# Patient Record
Sex: MALE | Race: WHITE | Employment: UNEMPLOYED | URBAN - METROPOLITAN AREA
[De-identification: names, ages, dates, MRNs, and addresses within clinical notes are randomized per-mention and may not be internally consistent; named-entity substitution may affect disease eponyms.]

---

## 2019-12-11 ENCOUNTER — TELEPHONE (OUTPATIENT)
Dept: ENT CLINIC | Age: 24
End: 2019-12-11

## 2019-12-11 ENCOUNTER — OFFICE VISIT (OUTPATIENT)
Dept: ENT CLINIC | Age: 24
End: 2019-12-11
Payer: COMMERCIAL

## 2019-12-11 ENCOUNTER — PROCEDURE VISIT (OUTPATIENT)
Dept: AUDIOLOGY | Age: 24
End: 2019-12-11
Payer: COMMERCIAL

## 2019-12-11 VITALS — DIASTOLIC BLOOD PRESSURE: 72 MMHG | OXYGEN SATURATION: 98 % | SYSTOLIC BLOOD PRESSURE: 144 MMHG | HEART RATE: 98 BPM

## 2019-12-11 DIAGNOSIS — H90.41 SENSORINEURAL HEARING LOSS (SNHL) OF RIGHT EAR WITH UNRESTRICTED HEARING OF LEFT EAR: Primary | ICD-10-CM

## 2019-12-11 DIAGNOSIS — H90.5 HEARING LOSS, SENSORINEURAL, UNILATERAL: ICD-10-CM

## 2019-12-11 PROCEDURE — 92557 COMPREHENSIVE HEARING TEST: CPT | Performed by: AUDIOLOGIST

## 2019-12-11 PROCEDURE — 99203 OFFICE O/P NEW LOW 30 MIN: CPT | Performed by: OTOLARYNGOLOGY

## 2019-12-11 ASSESSMENT — ENCOUNTER SYMPTOMS
VOICE CHANGE: 0
ALLERGIC/IMMUNOLOGIC NEGATIVE: 1
FACIAL SWELLING: 0
RESPIRATORY NEGATIVE: 1
RHINORRHEA: 0
SORE THROAT: 0
TROUBLE SWALLOWING: 0
EYES NEGATIVE: 1
SINUS PRESSURE: 0
SINUS PAIN: 0

## 2019-12-17 ENCOUNTER — HOSPITAL ENCOUNTER (OUTPATIENT)
Dept: MRI IMAGING | Age: 24
Discharge: HOME | End: 2019-12-17
Payer: COMMERCIAL

## 2019-12-17 DIAGNOSIS — H90.5 HEARING LOSS, SENSORINEURAL, UNILATERAL: ICD-10-CM

## 2019-12-17 PROCEDURE — 2580000003 HC RX 258: Performed by: OTOLARYNGOLOGY

## 2019-12-17 PROCEDURE — 6360000004 HC RX CONTRAST MEDICATION: Performed by: OTOLARYNGOLOGY

## 2019-12-17 PROCEDURE — A9577 INJ MULTIHANCE: HCPCS | Performed by: OTOLARYNGOLOGY

## 2019-12-17 PROCEDURE — 70553 MRI BRAIN STEM W/O & W/DYE: CPT

## 2019-12-17 RX ORDER — SODIUM CHLORIDE 0.9 % (FLUSH) 0.9 %
10 SYRINGE (ML) INJECTION ONCE
Status: COMPLETED | OUTPATIENT
Start: 2019-12-17 | End: 2019-12-17

## 2019-12-17 RX ADMIN — GADOBENATE DIMEGLUMINE 17 ML: 529 INJECTION, SOLUTION INTRAVENOUS at 11:05

## 2019-12-17 RX ADMIN — Medication 10 ML: at 11:06

## 2020-09-09 ENCOUNTER — OFFICE VISIT (OUTPATIENT)
Dept: ORTHOPEDIC SURGERY | Age: 25
End: 2020-09-09

## 2020-09-09 ENCOUNTER — OFFICE VISIT (OUTPATIENT)
Dept: ORTHOPEDIC SURGERY | Age: 25
End: 2020-09-09
Payer: COMMERCIAL

## 2020-09-09 VITALS — HEIGHT: 74 IN | BODY MASS INDEX: 21.17 KG/M2 | WEIGHT: 165 LBS

## 2020-09-09 PROCEDURE — 99999 PR OFFICE/OUTPT VISIT,PROCEDURE ONLY: CPT | Performed by: ORTHOPAEDIC SURGERY

## 2020-09-09 PROCEDURE — 99203 OFFICE O/P NEW LOW 30 MIN: CPT | Performed by: ORTHOPAEDIC SURGERY

## 2020-09-09 NOTE — PROGRESS NOTES
Chief Complaint  Elbow Pain (R ELBOW )      History of Present Illness:  Hermine Scheuermann is a 31 y.o. male , right-hand-dominant, currently unemployed, presenting with history of right elbow injury  Date of injury: Approximately 3 weeks ago  Mechanism of injury: The patient was trying out a hover board when he felt onto his right elbow directly onto the concrete  He did have immediate pain and swelling and has had difficulty with motion of his elbow since that time. He has not had any formal treatment but followed up earlier today with my partner Dr. Robert Sahu where images were obtained demonstrating a comminuted olecranon fracture. He states that he did delay his treatment and evaluation secondary to several factors including financial constraints. He has been trying to use his right elbow but has had difficulty and has noticed progressive stiffness as well as persistent pain and swelling. No specific numbness or tingling described in his right upper extremity or fingers    Medical History  Patient's medications, allergies, past medical, surgical, social and family histories were reviewed and updated as appropriate. Review of Systems  Pertinent items are noted in HPI  Review of systems reviewed from Patient History Form dated on 9/9/20 and available in the patient's chart under the Media tab. Vital Signs  There were no vitals filed for this visit. General Exam:   Constitutional: Patient is adequately groomed with no evidence of malnutrition  Mental Status: The patient is oriented to time, place and person. The patient's mood and affect are appropriate. Lymphatic: The lymphatic examination bilaterally reveals all areas to be without enlargement or induration. Neurological: The patient has good coordination. There is no weakness or sensory deficit.      Right Elbow/right upper extremity examination  Inspection: Moderate swelling and right elbow effusion  No open wounds or skin changes throughout the right elbow including abrasions  No obvious deformity of the right elbow  No swelling throughout the remainder of the right upper extremity    Palpation: Tenderness to palpation overlying the olecranon process and proximal subcutaneous border of the ulna  Grossly nontender throughout the remainder of the elbow forearm or wrist    Range of Motion: Limited range of motion today with testing patient demonstrates active range of motion approximately 60 to 110 degrees of flexion with discomfort, no gross instability  70 degrees of pronation and supination with mild discomfort  Painless wrist and finger range of motion is full composite fist and full extension of all fingers    Strength: 5 out of 5 strength AIN/PIN/interossei  5 out of 5 FDS FDP EDC interossei    Special Tests: Gross sensation intact light touch median ulnar radial nerve without deficit  2+ palpable radial pulse with brisk capillary refill all fingers    Skin: There are no additional worrisome rashes, ulcerations or lesions. Gait: normal    Circulation normal    Additional Comments:     Additional Examinations:  Left Upper Extremity: Examination of the left upper extremity does not show any tenderness, deformity or injury. Range of motion is unremarkable. There is no gross instability. There are no rashes, ulcerations or lesions. Strength and tone are normal.      Radiology:     X-rays reviewed in office:  3 views of the right elbow from 9/9/2020 demonstrate comminuted displaced olecranon fracture right elbow with no evidence of interval healing. No additional osseous abnormality or joint subluxation or dislocation      Assessment: 53-year-old male presenting with history of right elbow injury after fall from hover board approximately 3 weeks ago  1. Subacute right comminuted olecranon fracture    Impression:  Encounter Diagnoses   Name Primary?     Right elbow pain Yes    Closed fracture of right olecranon process, initial encounter        Office Procedures:  No orders of the defined types were placed in this encounter. Treatment Plan: I reviewed the images and discussed with the patient today his diagnosis. Unfortunately he is now 3 weeks status post injury and has had continued discomfort and swelling. Based on his displacement and comminution I think that the most appropriate option for him would be to consider operative intervention with open reduction internal fixation of his fracture. This would likely be performed with plate and screw fixation with the possible need for additional bone grafting. I do think the challenges include at this time related to his delay in treatment the likelihood of stiffness postoperatively as well as the possibility of more difficult reduction and treatment secondary to his delay but I do think that his best option would be to proceed with treatment. The risks and benefits of surgical fixation versus non-operative management of the elbow fracture were discussed thoroughly. These included, but were not limited to infection, tendon or nerve injury, stiffness or pain of the elbow joint long-term, malunion, nonunion, implant failure, tendon rupture, scar sensitivity, adverse effects of anesthesia, and possible need for hardware removal.  I acknowledge that the patient is in a difficult situation now secondary to his financial constraints and did refer him to financial aid department today to discuss best options for him regarding the potential for surgical intervention. He is understanding and right now we will plan to proceed with the process of surgery scheduling with consent obtained in clinic today preoperative medical work-up initiated. He specifically understands the possibility with further delaying treatment of difficulty with treatment of his injury and further likelihood of unpredictable outcomes.   He also understands postoperative course necessary including protection and period of immobilization followed by progressive range of motion of his elbow.

## 2020-09-09 NOTE — PROGRESS NOTES
Present Illness:  Tereza Herr is a 31 y.o. male patient is a 58-year-old male being seen for the first time he fell off a hover board 3 weeks ago injuring his right elbow    Chief complaint presents in the office today: Right elbow pain    Location right elbow  Severity severe/moderate  Duration 3 weeks  Associated sign/symptoms right elbow swelling and loss of motion    I have reviewed and discussed the below Pain assessment findings with the patient. Medical History  Patient's medications, allergies, past medical, surgical, social and family histories were reviewed and updated as appropriate. No past medical history on file. No family history on file. Social History     Socioeconomic History    Marital status: Single     Spouse name: None    Number of children: None    Years of education: None    Highest education level: None   Occupational History    None   Social Needs    Financial resource strain: None    Food insecurity     Worry: None     Inability: None    Transportation needs     Medical: None     Non-medical: None   Tobacco Use    Smoking status: Light Tobacco Smoker    Smokeless tobacco: Never Used   Substance and Sexual Activity    Alcohol use: No    Drug use: No    Sexual activity: Not Currently   Lifestyle    Physical activity     Days per week: None     Minutes per session: None    Stress: None   Relationships    Social connections     Talks on phone: None     Gets together: None     Attends Congregation service: None     Active member of club or organization: None     Attends meetings of clubs or organizations: None     Relationship status: None    Intimate partner violence     Fear of current or ex partner: None     Emotionally abused: None     Physically abused: None     Forced sexual activity: None   Other Topics Concern    None   Social History Narrative    None     No current outpatient medications on file.      No current facility-administered medications for this degrees very stiff very sore    Strength: Right shoulder strength:   internal rotation against resistance is 5/5  external rotation against resistance is 5/5  and supraspinatus isolation against resistance is 5/5, Shoulder shrug is 5 over 5 , cervical spine strength is excellent, flexion extension at the elbow is 5 over 5, wrist and hand strength is equal bilaterally with supination pronation and flexion and extension  no winging no muscle atrophy. He can slightly flex and extend against resistance of the elbow which is very painful moderate to severe      Special Tests:  Palpation demonstrates no swelling no effusion moderate pain. There is extremely limited active and passive range of motion. Strength is intact but weak with internal rotation against resistance external rotation against resistance supraspinatus isolation against resistance. Shoulder shrug strength is 5 over 5 equal bilaterally. Radial ulnar and median nerve function is intact. Capillary refill is brisk. Sensation is intact from neck down to the fingers. Elbow motion finger and wrist motion is full equal bilaterally. Deep tendon reflexes of the Brachial radialis, biceps, triceps are all +2/4 equal bilaterally. Cervical spine range of motion is full without pain negative Spurling's test.  Load-and-shift test is negative. Crank test is negative. Apprehension and relocation are negative. Anterior and posterior glide are equal bilaterally. Negative sulcus sign. No signs of any significant multidirectional instability. There is no scapular winging. There is no muscle atrophy of the latissimus dorsi, the deltoid, the periscapular musculature, the trapezius musculature or the pectoralis musculature. Positive Neer's test, positive Zavaleta test, positive pain with crossarm elevation.   Elbow pain to palpation with moderate swelling no abrasions no lacerations no signs of any laceration or open areas just painful to palpation and swollen and loss of motion      Gait: normal gait     Reflex: Deep tendon reflexes of the biceps, triceps, brachioradialis +2/4 equal bilaterally. Lower extremity reflexes:  +2/4 and equal bilaterally for patella and Achilles. Contralateral Shoulder exam: Palpation demonstrates no swelling no effusion no pain. There is full active and passive range of motion bilaterally. Strength is excellent with internal rotation against resistance external rotation against resistance supraspinatus isolation against resistance. Shoulder shrug strength is 5 over 5 equal bilaterally. Radial ulnar and median nerve function is intact. Capillary refill is brisk. Elbow motion finger and wrist motion is full equal bilaterally. Deep tendon reflexes of the Brachial radialis, biceps, triceps are all +2/4 equal bilaterally. Cervical spine range of motion is full without pain negative Spurling's test.  Load-and-shift test is negative. Crank test is negative. Apprehension and relocation are negative. Anterior and posterior glide are equal bilaterally. Negative sulcus sign. No signs of any significant multidirectional instability. There is no scapular winging. There is no muscle atrophy of the latissimus dorsi, the deltoid, the periscapular musculature, the trapezius musculature or the pectoralis musculature. Negative Neer's test, negative Zavaleta test, no pain with crossarm elevation. Abduction --150 degrees  Flexion-- 180 degrees  Extension-- between 45-60 degrees  Latera/external rotation --close to 90 degrees  Medial/ internal rotation -- between 70-90 degree    CERVICAL SPINE  EXAMINATION:  · Inspection: Local inspection shows no step-off or bruising. Cervical alignment is normal. No instability is noted. · Palpation and Percussion: No evidence of tenderness at the midline. Paraspinal tenderness is not present. There is no paraspinal spasm.   · Range of Motion:  pain-free ROM   · Strength: 5/5 bilateral upper extremities. · Special Tests:   Spurling's and Conrad's are negative bilaterally. Zavaleta and Impingement tests are negative bilaterally. · Skin:There are no rashes, ulcerations or lesions. · Reflexes: Bilaterally triceps, biceps and brachioradialis are 2+. Clonus absent bilaterally at the feet. No pathological reflexes are noted. · Gait & station:  normal, patient ambulates without assistance and no ataxia      Cranial nerve exam:    1- smell-- patient states no Olfactory problem  2- visual acuity is intact  3- moves eyes, and pupils are reactive  4- extra-ocular muscles are intact  5- facial sensation is intact no muscle atrophy  6- extra ocular muscles are intact  7- mouth moist and facial expressions are intact  8- good hearing and no difficulty with recognition  9- patient has no difficulty swallowing  10- no difficulty breathing and no Gastrointestinal problems good cough   11- moves head with all motion and no swallowing problems  12- normal speech and tongue protrudes midline    Additional Examinations:  Right Lower Extremity: Examination of the right lower extremity does not show any tenderness, deformity or injury. Range of motion is unremarkable. There is no gross instability. There are no rashes, ulcerations or lesions. Strength and tone are normal.  Left Lower Extremity: Examination of the left lower extremity does not show any tenderness, deformity or injury. Range of motion is unremarkable. There is no gross instability. There are no rashes, ulcerations or lesions.   Strength and tone are normal.        IMPRESSION:    Diagnostic testing:  X-rays reviewed in office by myself, I personally and independently reviewed the films in the office today : I reviewed multiple X-rays today of the right elbow:  Anterior posterior, lateral, :  Show displaced olecranon fracture, no dislocation, no signs of any masses or tumors, no significant arthritis,  good joint space maintenance,    Impression 1week-old displaced olecranon fracture  MRI: Not at this time  Labs:None at this time. No results found. No past surgical history on file. .    Office Procedures:  Orders Placed This Encounter   Procedures    XR ELBOW RIGHT (MIN 3 VIEWS)     Standing Status:   Future     Number of Occurrences:   1     Standing Expiration Date:   2021       Previous Treatments: Ice and rest, X-ray, anti-inflammatories,    Differential Diagnoses: Instability, loose body, soft tissue injury, ligament and tendon injury, long head of bicep injury, neck pathology, brachioplexis injury, muscle injury, neck radiculopathy, bone tumor, fracture or stress fracture. Diagnosis displaced olecranon fracture      Plan (Medical Decision Making):    I discussed the diagnosis and the treatment options with Viki Prakash today. In Summary:  The various treatment options were outlined and discussed with Viki Prakash including:  Conservative care options: physical therapy, ice, medications, bracing, and activity modification. The indications for therapeutic injections. The indications for additional imaging/laboratory studies. The indications for (possible future) interventions. After considering the various options discussed, Viki Prakash elected to pursue a course of treatment that includes the followin. Medications: No further recommendations for new medications. 2. PT:  Just no exercise or movement at this time    3. Further studies: No further studies. 4. Interventional: Discussed with him in great detail he cannot leave this alone at this point he will not have any use of this elbow we need to set him up with 1 of our hand surgeons for an evaluation and treatment unfortunately at this point he has no insurance I will not charge him today and we will try to get him help from the hospital    5.  Healthy Lifestyle Measures:  Patient education material reviewing the following was distributed to [address] [place] [address] Jaclyn Gomez  Anatomic drawings  Healthy lifestyle education  Osteoporosis prevention,   Back and neck pain educational information   Advanced imaging preparedness    Posture education   Proper lifting and carrying techniques,  Weight management discussed  For further information regarding the spine conditions and to review interventional treatments the patient was directed to [facility].    6.  Follow up:  as needed    Casimiro Prather was instructed to call the office if his symptoms worsen or if new symptoms appear prior to the next scheduled visit. He is specifically instructed to contact the office between now & his scheduled appointment if he has concerns related to his condition or if he needs assistance in scheduling the above tests. He is   welcome to call for an appointment sooner if he has any additional concerns or questions. Matty Coy D.O. [facility]  [facility] Trained  Board Certified  [facility] Team Physician      Disclaimer: \"This note was dictated with voice recognition software. Though review and correction are routine, we apologize for any errors. \"

## 2020-09-10 ENCOUNTER — NURSE ONLY (OUTPATIENT)
Dept: PRIMARY CARE CLINIC | Age: 25
End: 2020-09-10

## 2020-09-10 PROCEDURE — 99211 OFF/OP EST MAY X REQ PHY/QHP: CPT | Performed by: NURSE PRACTITIONER

## 2020-09-10 NOTE — PROGRESS NOTES
Treatment order in effect during my hospitalization, the order may or may not be in effect during this procedure. I give my doctor permission to give me blood or blood products. I understand that there are risks with receiving blood such as hepatitis, AIDS, fever, or allergic reaction. I acknowledge that the risks, benefits, and alternatives of this treatment have been explained to me and that no express or implied warranty has been given by the hospital, any blood bank, or any person or entity as to the blood or blood components transfused. At the discretion of my doctor, I agree to allow observers, equipment/product representatives and allow photographing, and/or televising of the procedure, provided my name or identity is maintained confidentially. I agree the hospital may dispose of or use for scientific or educational purposes any tissue, fluid, or body parts which may be removed.     ________________________________Date________Time______ am/pm  ([facility])  Patient or Signature of Closest Relative or Legal Guardian    ________________________________Date________Time______am/pm      Page 1 of  1  Witness

## 2020-09-11 ENCOUNTER — ANESTHESIA EVENT (OUTPATIENT)
Dept: OPERATING ROOM | Age: 25
End: 2020-09-11
Payer: COMMERCIAL

## 2020-09-11 NOTE — PROGRESS NOTES
[facility] PRE-SURGICAL TESTING INSTRUCTIONS                              PRIOR TO PROCEDURE DATE:  1. Please follow any guidelines/instructions prior to your procedure as advised by your surgeon. 2. Arrange for someone to drive you home and be with you for the first 24 hours after discharge for your safety after your procedure for which you received sedation. Ensure it is someone we can share information with regarding your discharge. 3. You must contact your surgeon for instructions IF:   You are taking any blood thinners, aspirin, anti-inflammatory or vitamin E.   There is a change in your physical condition such as a cold, fever, rash, cuts, sores or any other infection, especially near your surgical site. 4. Do not drink alcohol the day before or day of your procedure. 5. A Pre-op History and Physical for surgery MUST be completed by your Physician or Urgent Care within 30 days of your procedure date. Please bring a copy with you on the day of your procedure and along with any other testing performed. THE DAY OF YOUR PROCEDURE:  1. Follow instructions for ARRIVAL TIME as DIRECTED BY YOUR SURGEON. I    2. Enter the MAIN entrance from [address] and follow the signs to the free [employer] or [employer] parking (offered free of charge 6am-5pm). 3. Enter the Main Entrance of the hospital (do not enter from the lower level of the parking garage). Upon entrance, check in with the  at the main desk on your left. If no one is available at the desk, proceed into the [facility] Waiting Room and go through the door directly into the [facility]. There is a Check-in desk ACROSS from Room 5 (marked with a sign hanging from the ceiling). The phone number for the surgery center is [phone]. 4. Please call [phone] option #2 option #2 if you have not been preregistered yet. On the day of your procedure bring your insurance card and photo ID.  You will be registered at your bedside once brought back to your room. 5. DO NOT EAT ANYTHING eight hours prior to surgery. May have 8 ounces of water 4 hours prior to surgery. 6. MEDICATIONS    Take the following medications with a SMALL sip of water:    Use your usual dose of inhalers the morning of surgery. BRING your rescue inhaler with you to hospital.    Anesthesia does NOT want you to take insulin the morning of surgery. They will control your blood sugar while you are at the hospital. Please contact your ordering physician for instructions regarding your insulin the night before your procedure. If you have an insulin pump, please keep it set on basal rate. 7. Do not swallow water when brushing teeth. No gum, candy, mints or ice chips. Refrain from smoking or at least decrease the amount. 8. Dress in loose, comfortable clothing appropriate for redressing after your procedure. Do not wear jewelry (including body piercings), make-up (especially NO eye make-up), fingernail polish (NO toenail polish if foot/leg surgery), lotion, powders or metal hairclips. 9. Dentures, glasses, or contacts will need to be removed before your procedure. Bring cases for your glasses, contacts, dentures, or hearing aids to protect them while you are in surgery. 10. If you use a CPAP, please bring it with you on the day of your procedure. 11. We recommend that valuable personal  belongings such as cash, cell phones, e-tablets or jewelry, be left at home during your stay. The hospital will not be responsible for valuables that are not secured in the hospital safe. However, if your insurance requires a co-pay, you may want to bring a method of payment, i.e. Check or credit card, if you wish to pay your co-pay the day of surgery. 12. If you are to stay overnight, you may bring a bag with personal items.  Please have any large items you may need brought in by your family after your arrival to your hospital room.    13. If you have a Living Will or Durable Power of , please bring a copy on the day of your procedure. 15. With your permission, one family member may accompany you while you are being prepared for surgery. Once you are ready, additional family members may join you. HOW WE KEEP YOU SAFE and WORK TO PREVENT SURGICAL SITE INFECTIONS:  1. Health care workers should always check your ID bracelet to verify your name and birth date. You will be asked many times to state your name, date of birth, and allergies. 2. Health care workers should always clean their hands with soap or alcohol gel before providing care to you. It is okay to ask anyone if they cleaned their hands before they touch you. 3. You will be actively involved in verifying the type of procedure you are having and ensuring the correct surgical site. This will be confirmed multiple times prior to your procedure. Do NOT drea your surgery site UNLESS instructed to by your surgeon. 4. Do not shave or wax for 72 hours prior to procedure near your operative site. Shaving with a razor can irritate your skin and make it easier to develop an infection. On the day of your procedure, any hair that needs to be removed near the surgical site will be clipped by a healthcare worker using a special clippers designed to avoid skin irritation. 5. When you are in the operating room, your surgical site will be cleansed with a special soap, and in most cases, you will be given an antibiotic before the surgery begins. What to expect AFTER YOUR PROCEDURE:  1. Immediately following your procedure, your will be taken to the PACU for the first phase of your recovery. Your nurse will help you recover from any potential side effects of anesthesia, such as extreme drowsiness, changes in your vital signs or breathing patterns. Nausea, headache, muscle aches, or sore throat may also occur after anesthesia.   Your nurse will help you manage these potential side effects. 2. For comfort and safety, arrange to have someone at home with you for the first 24 hours after discharge. 3. You and your family will be given written instructions about your diet, activity, dressing care, medications, and return visits. 4. Once at home, should issues with nausea, pain, or bleeding occur, or should you notice any signs of infection, you should call your surgeon. 5. Always clean your hands before and after caring for your wound. Do not let your family touch your surgery site without cleaning their hands. 6. Narcotic pain medications can cause significant constipation. You may want to add a stool softener to your postoperative medication schedule or speak to your surgeon on how best to manage this SIDE EFFECT. SPECIAL INSTRUCTIONS     Thank you for allowing us to care for you. We strive to exceed your expectations in the delivery of care and service provided to you and your family. If you need to contact us for any reason, please call us at [phone]    Instructions reviewed with patient during preadmission testing phone interview. Alley Romero. 9/11/2020 .7:46 AM      ADDITIONAL EDUCATIONAL INFORMATION REVIEWED PER PHONE WITH YOU AND/OR YOUR FAMILY:  No Bring a urine sample on day of surgery  Yes Pain Goal-Taking Control of Your Pain  Yes FAQs about Surgical Site Infections  No Hibiclens® Bathing Instructions   Yes Antibacterial Soap  No Moises® Wipes Bathing Instructions (Obtained from: https://www.FRESS/. pdf )  No Incentive Spirometer Education  No Other

## 2020-09-12 LAB — SARS-COV-2, NAA: NOT DETECTED

## 2020-09-14 ENCOUNTER — APPOINTMENT (OUTPATIENT)
Dept: GENERAL RADIOLOGY | Age: 25
End: 2020-09-14
Attending: ORTHOPAEDIC SURGERY
Payer: COMMERCIAL

## 2020-09-14 ENCOUNTER — HOSPITAL ENCOUNTER (OUTPATIENT)
Age: 25
Setting detail: OUTPATIENT SURGERY
Discharge: HOME | End: 2020-09-14
Attending: ORTHOPAEDIC SURGERY | Admitting: ORTHOPAEDIC SURGERY
Payer: COMMERCIAL

## 2020-09-14 ENCOUNTER — ANESTHESIA (OUTPATIENT)
Dept: OPERATING ROOM | Age: 25
End: 2020-09-14
Payer: COMMERCIAL

## 2020-09-14 VITALS
BODY MASS INDEX: 23.74 KG/M2 | RESPIRATION RATE: 26 BRPM | OXYGEN SATURATION: 97 % | SYSTOLIC BLOOD PRESSURE: 146 MMHG | DIASTOLIC BLOOD PRESSURE: 86 MMHG | HEART RATE: 110 BPM | WEIGHT: 185 LBS | HEIGHT: 74 IN | TEMPERATURE: 98.9 F

## 2020-09-14 VITALS
TEMPERATURE: 97.3 F | RESPIRATION RATE: 11 BRPM | SYSTOLIC BLOOD PRESSURE: 154 MMHG | OXYGEN SATURATION: 98 % | DIASTOLIC BLOOD PRESSURE: 68 MMHG

## 2020-09-14 PROCEDURE — 73070 X-RAY EXAM OF ELBOW: CPT

## 2020-09-14 PROCEDURE — 6360000002 HC RX W HCPCS: Performed by: ORTHOPAEDIC SURGERY

## 2020-09-14 PROCEDURE — 3209999900 FLUORO FOR SURGICAL PROCEDURES

## 2020-09-14 PROCEDURE — 3600000014 HC SURGERY LEVEL 4 ADDTL 15MIN: Performed by: ORTHOPAEDIC SURGERY

## 2020-09-14 PROCEDURE — 6360000002 HC RX W HCPCS: Performed by: NURSE ANESTHETIST, CERTIFIED REGISTERED

## 2020-09-14 PROCEDURE — 2580000003 HC RX 258: Performed by: NURSE ANESTHETIST, CERTIFIED REGISTERED

## 2020-09-14 PROCEDURE — 2500000003 HC RX 250 WO HCPCS: Performed by: NURSE ANESTHETIST, CERTIFIED REGISTERED

## 2020-09-14 PROCEDURE — 2720000010 HC SURG SUPPLY STERILE: Performed by: ORTHOPAEDIC SURGERY

## 2020-09-14 PROCEDURE — C1762 CONN TISS, HUMAN(INC FASCIA): HCPCS | Performed by: ORTHOPAEDIC SURGERY

## 2020-09-14 PROCEDURE — 73080 X-RAY EXAM OF ELBOW: CPT

## 2020-09-14 PROCEDURE — 64415 NJX AA&/STRD BRCH PLXS IMG: CPT | Performed by: ANESTHESIOLOGY

## 2020-09-14 PROCEDURE — 3700000000 HC ANESTHESIA ATTENDED CARE: Performed by: ORTHOPAEDIC SURGERY

## 2020-09-14 PROCEDURE — 7100000001 HC PACU RECOVERY - ADDTL 15 MIN: Performed by: ORTHOPAEDIC SURGERY

## 2020-09-14 PROCEDURE — 6360000002 HC RX W HCPCS: Performed by: ANESTHESIOLOGY

## 2020-09-14 PROCEDURE — 3700000001 HC ADD 15 MINUTES (ANESTHESIA): Performed by: ORTHOPAEDIC SURGERY

## 2020-09-14 PROCEDURE — 2500000003 HC RX 250 WO HCPCS: Performed by: ANESTHESIOLOGY

## 2020-09-14 PROCEDURE — 2580000003 HC RX 258: Performed by: ANESTHESIOLOGY

## 2020-09-14 PROCEDURE — C1713 ANCHOR/SCREW BN/BN,TIS/BN: HCPCS | Performed by: ORTHOPAEDIC SURGERY

## 2020-09-14 PROCEDURE — 3600000004 HC SURGERY LEVEL 4 BASE: Performed by: ORTHOPAEDIC SURGERY

## 2020-09-14 PROCEDURE — 2580000003 HC RX 258: Performed by: ORTHOPAEDIC SURGERY

## 2020-09-14 PROCEDURE — 2709999900 HC NON-CHARGEABLE SUPPLY: Performed by: ORTHOPAEDIC SURGERY

## 2020-09-14 PROCEDURE — 7100000000 HC PACU RECOVERY - FIRST 15 MIN: Performed by: ORTHOPAEDIC SURGERY

## 2020-09-14 DEVICE — IMPLANTABLE DEVICE: Type: IMPLANTABLE DEVICE | Status: FUNCTIONAL

## 2020-09-14 DEVICE — SCREW BNE L20MM DIA3.5MM CORT DST TIB TYP II ANODIZED ST: Type: IMPLANTABLE DEVICE | Status: FUNCTIONAL

## 2020-09-14 DEVICE — SCREW BNE L20MM DIA3.5MM STD CORT DST TIB TI ST LOK FULL: Type: IMPLANTABLE DEVICE | Status: FUNCTIONAL

## 2020-09-14 DEVICE — SCREW BNE L20MM DIA3.5MM CO CHROM ST LOK FULL THRD SQ DRV: Type: IMPLANTABLE DEVICE | Status: FUNCTIONAL

## 2020-09-14 DEVICE — SCREW BNE L14MM DIA3.5MM STD CORT DST TIB TI ST LOK FULL: Type: IMPLANTABLE DEVICE | Status: FUNCTIONAL

## 2020-09-14 DEVICE — K WIRE FIX L6IN DIA1.6MM FEM TIB SMOOTH BAYNT TOT FT FOR: Type: IMPLANTABLE DEVICE | Status: FUNCTIONAL

## 2020-09-14 DEVICE — WASHER ORTH DIA3.5MM CORT DST FIBULAR EL LO PROF FOR FRAC: Type: IMPLANTABLE DEVICE | Status: FUNCTIONAL

## 2020-09-14 DEVICE — SCREW BNE L16MM DIA3.5MM STD CORT DST TIB TI ST LOK FULL: Type: IMPLANTABLE DEVICE | Status: FUNCTIONAL

## 2020-09-14 DEVICE — ALLOGRAFT BNE CHIP 4-9.5 MM 5 CC FD CANC STRL 31021405 ALLOSOURCE: Type: IMPLANTABLE DEVICE | Status: FUNCTIONAL

## 2020-09-14 RX ORDER — HYDROMORPHONE HCL 110MG/55ML
PATIENT CONTROLLED ANALGESIA SYRINGE INTRAVENOUS PRN
Status: DISCONTINUED | OUTPATIENT
Start: 2020-09-14 | End: 2020-09-14 | Stop reason: SDUPTHER

## 2020-09-14 RX ORDER — ROPIVACAINE HYDROCHLORIDE 5 MG/ML
INJECTION, SOLUTION EPIDURAL; INFILTRATION; PERINEURAL
Status: COMPLETED
Start: 2020-09-14 | End: 2020-09-14

## 2020-09-14 RX ORDER — METOCLOPRAMIDE HYDROCHLORIDE 5 MG/ML
10 INJECTION INTRAMUSCULAR; INTRAVENOUS
Status: COMPLETED | OUTPATIENT
Start: 2020-09-14 | End: 2020-09-14

## 2020-09-14 RX ORDER — METOPROLOL TARTRATE 5 MG/5ML
INJECTION INTRAVENOUS
Status: DISCONTINUED
Start: 2020-09-14 | End: 2020-09-14 | Stop reason: HOSPADM

## 2020-09-14 RX ORDER — ONDANSETRON 2 MG/ML
4 INJECTION INTRAMUSCULAR; INTRAVENOUS ONCE
Status: COMPLETED | OUTPATIENT
Start: 2020-09-14 | End: 2020-09-14

## 2020-09-14 RX ORDER — OXYCODONE HYDROCHLORIDE 5 MG/1
5 TABLET ORAL PRN
Status: DISCONTINUED | OUTPATIENT
Start: 2020-09-14 | End: 2020-09-14 | Stop reason: HOSPADM

## 2020-09-14 RX ORDER — FENTANYL CITRATE 50 UG/ML
INJECTION, SOLUTION INTRAMUSCULAR; INTRAVENOUS
Status: COMPLETED
Start: 2020-09-14 | End: 2020-09-14

## 2020-09-14 RX ORDER — MIDAZOLAM HYDROCHLORIDE 1 MG/ML
INJECTION INTRAMUSCULAR; INTRAVENOUS PRN
Status: DISCONTINUED | OUTPATIENT
Start: 2020-09-14 | End: 2020-09-14 | Stop reason: SDUPTHER

## 2020-09-14 RX ORDER — OXYCODONE HYDROCHLORIDE 5 MG/1
10 TABLET ORAL PRN
Status: DISCONTINUED | OUTPATIENT
Start: 2020-09-14 | End: 2020-09-14 | Stop reason: HOSPADM

## 2020-09-14 RX ORDER — METOPROLOL TARTRATE 5 MG/5ML
5 INJECTION INTRAVENOUS ONCE
Status: COMPLETED | OUTPATIENT
Start: 2020-09-14 | End: 2020-09-14

## 2020-09-14 RX ORDER — MORPHINE SULFATE 4 MG/ML
1 INJECTION, SOLUTION INTRAMUSCULAR; INTRAVENOUS EVERY 5 MIN PRN
Status: DISCONTINUED | OUTPATIENT
Start: 2020-09-14 | End: 2020-09-14 | Stop reason: HOSPADM

## 2020-09-14 RX ORDER — PROPOFOL 10 MG/ML
INJECTION, EMULSION INTRAVENOUS PRN
Status: DISCONTINUED | OUTPATIENT
Start: 2020-09-14 | End: 2020-09-14 | Stop reason: SDUPTHER

## 2020-09-14 RX ORDER — DEXAMETHASONE SODIUM PHOSPHATE 4 MG/ML
INJECTION, SOLUTION INTRA-ARTICULAR; INTRALESIONAL; INTRAMUSCULAR; INTRAVENOUS; SOFT TISSUE
Status: COMPLETED
Start: 2020-09-14 | End: 2020-09-14

## 2020-09-14 RX ORDER — MIDAZOLAM HYDROCHLORIDE 1 MG/ML
INJECTION INTRAMUSCULAR; INTRAVENOUS
Status: COMPLETED
Start: 2020-09-14 | End: 2020-09-14

## 2020-09-14 RX ORDER — DIPHENHYDRAMINE HYDROCHLORIDE 50 MG/ML
12.5 INJECTION INTRAMUSCULAR; INTRAVENOUS
Status: DISCONTINUED | OUTPATIENT
Start: 2020-09-14 | End: 2020-09-14 | Stop reason: HOSPADM

## 2020-09-14 RX ORDER — MEPERIDINE HYDROCHLORIDE 25 MG/ML
12.5 INJECTION INTRAMUSCULAR; INTRAVENOUS; SUBCUTANEOUS EVERY 5 MIN PRN
Status: DISCONTINUED | OUTPATIENT
Start: 2020-09-14 | End: 2020-09-14 | Stop reason: HOSPADM

## 2020-09-14 RX ORDER — ONDANSETRON 2 MG/ML
INJECTION INTRAMUSCULAR; INTRAVENOUS PRN
Status: DISCONTINUED | OUTPATIENT
Start: 2020-09-14 | End: 2020-09-14 | Stop reason: SDUPTHER

## 2020-09-14 RX ORDER — ONDANSETRON 2 MG/ML
INJECTION INTRAMUSCULAR; INTRAVENOUS
Status: DISCONTINUED
Start: 2020-09-14 | End: 2020-09-14 | Stop reason: HOSPADM

## 2020-09-14 RX ORDER — HYDRALAZINE HYDROCHLORIDE 20 MG/ML
5 INJECTION INTRAMUSCULAR; INTRAVENOUS EVERY 10 MIN PRN
Status: DISCONTINUED | OUTPATIENT
Start: 2020-09-14 | End: 2020-09-14 | Stop reason: HOSPADM

## 2020-09-14 RX ORDER — SODIUM CHLORIDE 0.9 % (FLUSH) 0.9 %
10 SYRINGE (ML) INJECTION PRN
Status: DISCONTINUED | OUTPATIENT
Start: 2020-09-14 | End: 2020-09-14 | Stop reason: HOSPADM

## 2020-09-14 RX ORDER — SODIUM CHLORIDE, SODIUM LACTATE, POTASSIUM CHLORIDE, CALCIUM CHLORIDE 600; 310; 30; 20 MG/100ML; MG/100ML; MG/100ML; MG/100ML
INJECTION, SOLUTION INTRAVENOUS CONTINUOUS PRN
Status: DISCONTINUED | OUTPATIENT
Start: 2020-09-14 | End: 2020-09-14 | Stop reason: SDUPTHER

## 2020-09-14 RX ORDER — SODIUM CHLORIDE 9 MG/ML
INJECTION, SOLUTION INTRAVENOUS CONTINUOUS
Status: DISCONTINUED | OUTPATIENT
Start: 2020-09-14 | End: 2020-09-14 | Stop reason: HOSPADM

## 2020-09-14 RX ORDER — ROCURONIUM BROMIDE 10 MG/ML
INJECTION, SOLUTION INTRAVENOUS PRN
Status: DISCONTINUED | OUTPATIENT
Start: 2020-09-14 | End: 2020-09-14 | Stop reason: SDUPTHER

## 2020-09-14 RX ORDER — FENTANYL CITRATE 50 UG/ML
INJECTION, SOLUTION INTRAMUSCULAR; INTRAVENOUS PRN
Status: DISCONTINUED | OUTPATIENT
Start: 2020-09-14 | End: 2020-09-14 | Stop reason: SDUPTHER

## 2020-09-14 RX ORDER — SODIUM CHLORIDE 0.9 % (FLUSH) 0.9 %
10 SYRINGE (ML) INJECTION EVERY 12 HOURS SCHEDULED
Status: DISCONTINUED | OUTPATIENT
Start: 2020-09-14 | End: 2020-09-14 | Stop reason: HOSPADM

## 2020-09-14 RX ORDER — DEXAMETHASONE SODIUM PHOSPHATE 4 MG/ML
INJECTION, SOLUTION INTRA-ARTICULAR; INTRALESIONAL; INTRAMUSCULAR; INTRAVENOUS; SOFT TISSUE PRN
Status: DISCONTINUED | OUTPATIENT
Start: 2020-09-14 | End: 2020-09-14 | Stop reason: SDUPTHER

## 2020-09-14 RX ORDER — LABETALOL 20 MG/4 ML (5 MG/ML) INTRAVENOUS SYRINGE
5 EVERY 10 MIN PRN
Status: DISCONTINUED | OUTPATIENT
Start: 2020-09-14 | End: 2020-09-14 | Stop reason: HOSPADM

## 2020-09-14 RX ORDER — LIDOCAINE HYDROCHLORIDE 20 MG/ML
INJECTION, SOLUTION INTRAVENOUS PRN
Status: DISCONTINUED | OUTPATIENT
Start: 2020-09-14 | End: 2020-09-14 | Stop reason: SDUPTHER

## 2020-09-14 RX ORDER — HYDROCODONE BITARTRATE AND ACETAMINOPHEN 5; 325 MG/1; MG/1
1 TABLET ORAL EVERY 6 HOURS PRN
Qty: 20 TABLET | Refills: 0 | Status: SHIPPED | OUTPATIENT
Start: 2020-09-14 | End: 2020-09-19

## 2020-09-14 RX ORDER — PROMETHAZINE HYDROCHLORIDE 25 MG/ML
6.25 INJECTION, SOLUTION INTRAMUSCULAR; INTRAVENOUS
Status: DISCONTINUED | OUTPATIENT
Start: 2020-09-14 | End: 2020-09-14 | Stop reason: HOSPADM

## 2020-09-14 RX ORDER — ROPIVACAINE HYDROCHLORIDE 5 MG/ML
INJECTION, SOLUTION EPIDURAL; INFILTRATION; PERINEURAL
Status: COMPLETED | OUTPATIENT
Start: 2020-09-14 | End: 2020-09-14

## 2020-09-14 RX ORDER — MAGNESIUM HYDROXIDE 1200 MG/15ML
LIQUID ORAL CONTINUOUS PRN
Status: COMPLETED | OUTPATIENT
Start: 2020-09-14 | End: 2020-09-14

## 2020-09-14 RX ADMIN — DEXAMETHASONE SODIUM PHOSPHATE 4 MG: 4 INJECTION, SOLUTION INTRAMUSCULAR; INTRAVENOUS at 12:40

## 2020-09-14 RX ADMIN — METOCLOPRAMIDE 10 MG: 5 INJECTION, SOLUTION INTRAMUSCULAR; INTRAVENOUS at 17:56

## 2020-09-14 RX ADMIN — HYDROMORPHONE HYDROCHLORIDE 1 MG: 2 INJECTION, SOLUTION INTRAMUSCULAR; INTRAVENOUS; SUBCUTANEOUS at 16:16

## 2020-09-14 RX ADMIN — CEFAZOLIN 2 G: 10 INJECTION, POWDER, FOR SOLUTION INTRAVENOUS at 13:19

## 2020-09-14 RX ADMIN — FAMOTIDINE 20 MG: 10 INJECTION, SOLUTION INTRAVENOUS at 12:44

## 2020-09-14 RX ADMIN — SODIUM CHLORIDE, SODIUM LACTATE, POTASSIUM CHLORIDE, AND CALCIUM CHLORIDE: 600; 310; 30; 20 INJECTION, SOLUTION INTRAVENOUS at 13:00

## 2020-09-14 RX ADMIN — MIDAZOLAM HYDROCHLORIDE 2 MG: 2 INJECTION, SOLUTION INTRAMUSCULAR; INTRAVENOUS at 12:40

## 2020-09-14 RX ADMIN — ROPIVACAINE HYDROCHLORIDE 30 ML: 5 INJECTION, SOLUTION EPIDURAL; INFILTRATION; PERINEURAL at 13:09

## 2020-09-14 RX ADMIN — HYDROMORPHONE HYDROCHLORIDE 1 MG: 2 INJECTION, SOLUTION INTRAMUSCULAR; INTRAVENOUS; SUBCUTANEOUS at 15:46

## 2020-09-14 RX ADMIN — FENTANYL CITRATE 50 MCG: 50 INJECTION INTRAMUSCULAR; INTRAVENOUS at 14:54

## 2020-09-14 RX ADMIN — ROCURONIUM BROMIDE 50 MG: 10 INJECTION INTRAVENOUS at 13:05

## 2020-09-14 RX ADMIN — ROCURONIUM BROMIDE 30 MG: 10 INJECTION INTRAVENOUS at 13:28

## 2020-09-14 RX ADMIN — ONDANSETRON 4 MG: 2 INJECTION INTRAMUSCULAR; INTRAVENOUS at 18:29

## 2020-09-14 RX ADMIN — ONDANSETRON 4 MG: 2 INJECTION INTRAMUSCULAR; INTRAVENOUS at 16:46

## 2020-09-14 RX ADMIN — PROPOFOL 200 MG: 10 INJECTION, EMULSION INTRAVENOUS at 13:05

## 2020-09-14 RX ADMIN — SODIUM CHLORIDE: 9 INJECTION, SOLUTION INTRAVENOUS at 12:12

## 2020-09-14 RX ADMIN — MIDAZOLAM HYDROCHLORIDE 2 MG: 2 INJECTION, SOLUTION INTRAMUSCULAR; INTRAVENOUS at 12:44

## 2020-09-14 RX ADMIN — METOPROLOL TARTRATE 5 MG: 5 INJECTION INTRAVENOUS at 18:52

## 2020-09-14 RX ADMIN — DEXAMETHASONE SODIUM PHOSPHATE 10 MG: 4 INJECTION, SOLUTION INTRAMUSCULAR; INTRAVENOUS at 16:46

## 2020-09-14 RX ADMIN — FENTANYL CITRATE 100 MCG: 50 INJECTION INTRAMUSCULAR; INTRAVENOUS at 12:40

## 2020-09-14 RX ADMIN — SODIUM CHLORIDE, SODIUM LACTATE, POTASSIUM CHLORIDE, AND CALCIUM CHLORIDE: 600; 310; 30; 20 INJECTION, SOLUTION INTRAVENOUS at 15:56

## 2020-09-14 RX ADMIN — FENTANYL CITRATE 50 MCG: 50 INJECTION INTRAMUSCULAR; INTRAVENOUS at 13:05

## 2020-09-14 RX ADMIN — LIDOCAINE HYDROCHLORIDE 100 MG: 20 INJECTION, SOLUTION INTRAVENOUS at 13:05

## 2020-09-14 ASSESSMENT — PULMONARY FUNCTION TESTS
PIF_VALUE: 16
PIF_VALUE: 15
PIF_VALUE: 15
PIF_VALUE: 16
PIF_VALUE: 16
PIF_VALUE: 1
PIF_VALUE: 15
PIF_VALUE: 16
PIF_VALUE: 15
PIF_VALUE: 16
PIF_VALUE: 15
PIF_VALUE: 15
PIF_VALUE: 1
PIF_VALUE: 15
PIF_VALUE: 16
PIF_VALUE: 16
PIF_VALUE: 15
PIF_VALUE: 16
PIF_VALUE: 16
PIF_VALUE: 15
PIF_VALUE: 15
PIF_VALUE: 13
PIF_VALUE: 4
PIF_VALUE: 16
PIF_VALUE: 18
PIF_VALUE: 0
PIF_VALUE: 15
PIF_VALUE: 15
PIF_VALUE: 1
PIF_VALUE: 15
PIF_VALUE: 15
PIF_VALUE: 16
PIF_VALUE: 16
PIF_VALUE: 15
PIF_VALUE: 14
PIF_VALUE: 15
PIF_VALUE: 2
PIF_VALUE: 15
PIF_VALUE: 16
PIF_VALUE: 15
PIF_VALUE: 0
PIF_VALUE: 15
PIF_VALUE: 16
PIF_VALUE: 15
PIF_VALUE: 16
PIF_VALUE: 15
PIF_VALUE: 16
PIF_VALUE: 15
PIF_VALUE: 16
PIF_VALUE: 16
PIF_VALUE: 15
PIF_VALUE: 13
PIF_VALUE: 15
PIF_VALUE: 15
PIF_VALUE: 16
PIF_VALUE: 16
PIF_VALUE: 13
PIF_VALUE: 15
PIF_VALUE: 16
PIF_VALUE: 16
PIF_VALUE: 15
PIF_VALUE: 15
PIF_VALUE: 16
PIF_VALUE: 16
PIF_VALUE: 15
PIF_VALUE: 1
PIF_VALUE: 16
PIF_VALUE: 15
PIF_VALUE: 15
PIF_VALUE: 16
PIF_VALUE: 16
PIF_VALUE: 15
PIF_VALUE: 15
PIF_VALUE: 16
PIF_VALUE: 15
PIF_VALUE: 16
PIF_VALUE: 15
PIF_VALUE: 15
PIF_VALUE: 16
PIF_VALUE: 16
PIF_VALUE: 15
PIF_VALUE: 16
PIF_VALUE: 16
PIF_VALUE: 15
PIF_VALUE: 15
PIF_VALUE: 16
PIF_VALUE: 16
PIF_VALUE: 15
PIF_VALUE: 24
PIF_VALUE: 15
PIF_VALUE: 15
PIF_VALUE: 16
PIF_VALUE: 15
PIF_VALUE: 16
PIF_VALUE: 16
PIF_VALUE: 15
PIF_VALUE: 15
PIF_VALUE: 16
PIF_VALUE: 15
PIF_VALUE: 16
PIF_VALUE: 15
PIF_VALUE: 16
PIF_VALUE: 15
PIF_VALUE: 15
PIF_VALUE: 16
PIF_VALUE: 15
PIF_VALUE: 16
PIF_VALUE: 15
PIF_VALUE: 15
PIF_VALUE: 13
PIF_VALUE: 13
PIF_VALUE: 19
PIF_VALUE: 15
PIF_VALUE: 16
PIF_VALUE: 16
PIF_VALUE: 15
PIF_VALUE: 16
PIF_VALUE: 15
PIF_VALUE: 2
PIF_VALUE: 1
PIF_VALUE: 15
PIF_VALUE: 16
PIF_VALUE: 15
PIF_VALUE: 16
PIF_VALUE: 2
PIF_VALUE: 16
PIF_VALUE: 16
PIF_VALUE: 15
PIF_VALUE: 15
PIF_VALUE: 16
PIF_VALUE: 15
PIF_VALUE: 15
PIF_VALUE: 16
PIF_VALUE: 15
PIF_VALUE: 16
PIF_VALUE: 15
PIF_VALUE: 4
PIF_VALUE: 15
PIF_VALUE: 4
PIF_VALUE: 15
PIF_VALUE: 15
PIF_VALUE: 14
PIF_VALUE: 16
PIF_VALUE: 15
PIF_VALUE: 15
PIF_VALUE: 16
PIF_VALUE: 15
PIF_VALUE: 16
PIF_VALUE: 15
PIF_VALUE: 16
PIF_VALUE: 15
PIF_VALUE: 15
PIF_VALUE: 16
PIF_VALUE: 15
PIF_VALUE: 16
PIF_VALUE: 15
PIF_VALUE: 16
PIF_VALUE: 15
PIF_VALUE: 14
PIF_VALUE: 16
PIF_VALUE: 1
PIF_VALUE: 15
PIF_VALUE: 16
PIF_VALUE: 16
PIF_VALUE: 14
PIF_VALUE: 15
PIF_VALUE: 16
PIF_VALUE: 13
PIF_VALUE: 15
PIF_VALUE: 15
PIF_VALUE: 16
PIF_VALUE: 16
PIF_VALUE: 13
PIF_VALUE: 16
PIF_VALUE: 15
PIF_VALUE: 15
PIF_VALUE: 3
PIF_VALUE: 16
PIF_VALUE: 13
PIF_VALUE: 15
PIF_VALUE: 16
PIF_VALUE: 16
PIF_VALUE: 0
PIF_VALUE: 16
PIF_VALUE: 15
PIF_VALUE: 20
PIF_VALUE: 16
PIF_VALUE: 15
PIF_VALUE: 15
PIF_VALUE: 16
PIF_VALUE: 15
PIF_VALUE: 15
PIF_VALUE: 16
PIF_VALUE: 16
PIF_VALUE: 15
PIF_VALUE: 16
PIF_VALUE: 15
PIF_VALUE: 16
PIF_VALUE: 13
PIF_VALUE: 16
PIF_VALUE: 14
PIF_VALUE: 15
PIF_VALUE: 15
PIF_VALUE: 16
PIF_VALUE: 16
PIF_VALUE: 15
PIF_VALUE: 16
PIF_VALUE: 16
PIF_VALUE: 15
PIF_VALUE: 16
PIF_VALUE: 14
PIF_VALUE: 15
PIF_VALUE: 16
PIF_VALUE: 15

## 2020-09-14 ASSESSMENT — LIFESTYLE VARIABLES: SMOKING_STATUS: 1

## 2020-09-14 ASSESSMENT — PAIN - FUNCTIONAL ASSESSMENT: PAIN_FUNCTIONAL_ASSESSMENT: 0-10

## 2020-09-14 NOTE — ANESTHESIA PRE PROCEDURE
Department of Anesthesiology  Preprocedure Note       Name:  Lori Watt   Age:  31 y.o.  :  1995                                          MRN:  [id number]         Date:  2020      Surgeon: Maria Ines Mccabe):  Starr Navarro MD    Procedure: Procedure(s):  OPERATIVE FIXATION WITH OPEN REDUCTION INTERNAL FIXATION OF RIGHT OLECRANON FRACTURE, ANY INDICATED PROCEDURES    Medications prior to admission:   Prior to Admission medications    Not on File       Current medications:    Current Facility-Administered Medications   Medication Dose Route Frequency Provider Last Rate Last Dose    ceFAZolin (ANCEF) 2 g in dextrose 5 % 50 mL IVPB  2 g Intravenous Once Starr Navarro MD        sodium chloride flush 0.9 % injection 10 mL  10 mL Intravenous 2 times per day Loreda Emmett, DO        sodium chloride flush 0.9 % injection 10 mL  10 mL Intravenous PRN Loreda Emmett, DO        0.9 % sodium chloride infusion   Intravenous Continuous Loreda Emmett  mL/hr at 20 1212      midazolam (VERSED) 2 MG/2ML injection             fentaNYL (SUBLIMAZE) 100 MCG/2ML injection             dexamethasone (DECADRON) 4 MG/ML injection             ropivacaine (NAROPIN) 0.5% injection                Allergies:  No Known Allergies    Problem List:  There is no problem list on file for this patient. Past Medical History:  History reviewed. No pertinent past medical history. Past Surgical History:  History reviewed. No pertinent surgical history. Social History:    Social History     Tobacco Use    Smoking status: Light Tobacco Smoker    Smokeless tobacco: Never Used   Substance Use Topics    Alcohol use:  No                                Ready to quit: Not Answered  Counseling given: Not Answered      Vital Signs (Current):   Vitals:    20 1146 20 1245 20 1250 20 1255   BP: (!) 150/69 (!) 165/97 (!) 160/96 (!) 170/96   Pulse: 90 108 88 89   Resp: 16 13 9 12   Temp: 98.2 °F (36.8 °C)      TempSrc: Oral      SpO2: 100% 100% 100% 100%   Weight: 185 lb (83.9 kg)      Height: 6' 2\" (1.88 m)                                                 BP Readings from Last 3 Encounters:   09/14/20 (!) 170/96   12/11/19 (!) 144/72       NPO Status: Time of last liquid consumption: 2100                        Time of last solid consumption: 2100                        Date of last liquid consumption: 09/13/20                        Date of last solid food consumption: 09/13/20    BMI:   Wt Readings from Last 3 Encounters:   09/14/20 185 lb (83.9 kg)   09/09/20 165 lb (74.8 kg)     Body mass index is 23.75 kg/m². CBC: No results found for: WBC, RBC, HGB, HCT, MCV, RDW, PLT    CMP: No results found for: NA, K, CL, CO2, BUN, CREATININE, GFRAA, AGRATIO, LABGLOM, GLUCOSE, PROT, CALCIUM, BILITOT, ALKPHOS, AST, ALT    POC Tests: No results for input(s): POCGLU, POCNA, POCK, POCCL, POCBUN, POCHEMO, POCHCT in the last 72 hours. Coags: No results found for: PROTIME, INR, APTT    HCG (If Applicable): No results found for: PREGTESTUR, PREGSERUM, HCG, HCGQUANT     ABGs: No results found for: PHART, PO2ART, TCT5KDW, NPU6WAO, BEART, Q6CPBTHE     Type & Screen (If Applicable):  No results found for: LABABO, LABRH    Drug/Infectious Status (If Applicable):  No results found for: HIV, HEPCAB    COVID-19 Screening (If Applicable):   Lab Results   Component Value Date    COVID19 NOT DETECTED 09/10/2020         Anesthesia Evaluation  Patient summary reviewed and Nursing notes reviewed  Airway: Mallampati: II  TM distance: >3 FB   Neck ROM: full  Mouth opening: > = 3 FB Dental: normal exam         Pulmonary:Negative Pulmonary ROS and normal exam  breath sounds clear to auscultation  (+) current smoker          Patient did not smoke on day of surgery.                  Cardiovascular:Negative CV ROS  Exercise tolerance: good (>4 METS),           Rhythm: regular  Rate: normal           Beta Blocker:  Not on Beta Blocker         Neuro/Psych: Negative Neuro/Psych ROS              GI/Hepatic/Renal: Neg GI/Hepatic/Renal ROS            Endo/Other: Negative Endo/Other ROS                    Abdominal:       Abdomen: soft. Vascular: negative vascular ROS. Anesthesia Plan      general     ASA 2       Induction: intravenous. MIPS: Postoperative opioids intended and Prophylactic antiemetics administered. Anesthetic plan and risks discussed with patient. Use of blood products discussed with patient whom consented to blood products. Plan discussed with attending and CRNA.     Attending anesthesiologist reviewed and agrees with Pre Eval content              Ludwin Delgado DO   9/14/2020

## 2020-09-14 NOTE — OP NOTE
as this fracture was subacute and nearly one month out from injury. .  The olecranon shaft more distally was also freed of the soft tissue. Nearby ulnar nerve was protected and identified. The nerve was released at osbournes ligament and more proximally as well as the interval between FCU muscle, taking care to identify and protect the nerve. .  Early hematoma was gently debrided from the fracture site and from within the joint. Cartilage appeared to be intact. The joint was copiously irrigated to assure that there was no loose body. We were able to reduce the fracture into anatomic alignment and hold with temporary K wires, with protecting the ulnar nerve. Fluoroscopy confirmed excellent alignment at the joint surface. The olecranon plate was placed on top of the triceps and along the shaft of the olecranon. Proximal screws and distal screws were all placed utilizing standard AO technique and taking great care to measure the appropriate length screws. Secondary to the amount of comminution, we performed a bridge plate construct proximal and distal fixation without placement of a homerun screw to prevent compression that may have further displaced the fracture. .  Temporizing K wires were all removed. The elbow was placed through a range of motion, motion was fluid and non-crepitant. Allograft cancellous bone graft chips were also placed at the fracture site and dorsal cortex as well as a large dorsal cortical piece of bone that was placed along the lateral cortex of the proximal ulna. No instability noted. Excellent alignment of the arm clinically and fluoroscopically. Final fluoroscopic images were saved and printed. Wound was copiously irrigated. No other abnormality noted. Skin was closed with interrupted, inverted Vicryl followed by nylon suture on the skin. Tourniquet was deflated and all fingers were warm and well perfused.   Patient tolerated the procedure well, was awoken from sedation, and was taken to the post anesthesia recovery unit in good condition. No complications. Findings:       Displaced olecranon fracture    Intervention:      Addendum: please note that 3 views [id number] views using c arm were used for the right elbow demonstrating appropriate fracture and joint alignment and appropriate position of hardware, personally reviewed and interpreted by me. Other Notes: Follow-up in approximately 7-10 days for wound inspection and suture removal.  Patient will be referred to the hand therapist for a posterior long-arm brace with the hand and wrist free. Brace off for hygiene, showers, and to begin early motion several times a day. Begin progressive range of motion of the elbow wrist and hand at the first postoperative visit. No lifting or strengthening for 6-8 weeks.           Ronni Weaver MD    Hand & Upper Extremity Surgery  [address] [facility] [place] [facility] ([facility])

## 2020-09-14 NOTE — PROGRESS NOTES
Patient to pacu 5, S/P  OPERATIVE FIXATION WITH OPEN REDUCTION INTERNAL FIXATION OF RIGHT OLECRANON FRACTURE, report received from CRNA, reported hemodynamically stable intra op.  Patient with oral airway in at this time SPo2 92%

## 2020-09-14 NOTE — H&P
Danielle Schmidt    [id number]      Department of General Surgery    Surgical Services     Pre-operative History and Physical      INDICATION:   Closed olecranon fracture, right, initial encounter [S52.021A]    Procedure(s):  OPERATIVE FIXATION WITH OPEN REDUCTION INTERNAL FIXATION OF RIGHT OLECRANON FRACTURE, ANY INDICATED PROCEDURES    CHIEF COMPLAINT: right shoulder pain and limited range of motion. History obtained from: Patient interview and EHR     HISTORY:   The patient is a 31 y.o. male with a past medical and surgical history are delineated below. He was on a hover board about 4.5 weeks ago and ended up falling off injuring his elbow. Weight loss/gain:  No  Recent Hx Steroid use: Yes  Used about 3 days of prednisone for inflammation after falling, stopped about 4 days ago  History of allergic reaction to anesthesia:  Never received anesthesia  Covid 19:  Patient denies fever, chills, cough or known exposure to Covid-19. Patient reports they have been quarantined at home since Covid-19 test.     Past Medical History:    History reviewed. No pertinent past medical history. Past Surgical History:    History reviewed. No pertinent surgical history. Medications Prior to Admission:   No outpatient medications taken    Allergies:  Patient has no known allergies.     Social History:   Social History     Socioeconomic History    Marital status: Single     Spouse name: None    Number of children: None    Years of education: None    Highest education level: None   Occupational History    None   Social Needs    Financial resource strain: None    Food insecurity     Worry: None     Inability: None    Transportation needs     Medical: None     Non-medical: None   Tobacco Use    Smoking status: Light Tobacco Smoker    Smokeless tobacco: Never Used   Substance and Sexual Activity    Alcohol use: No    Drug use: No    Sexual activity: Not Currently   Lifestyle    Physical activity     Days per week: None     Minutes per session: None    Stress: None   Relationships    Social connections     Talks on phone: None     Gets together: None     Attends Baptism service: None     Active member of club or organization: None     Attends meetings of clubs or organizations: None     Relationship status: None    Intimate partner violence     Fear of current or ex partner: None     Emotionally abused: None     Physically abused: None     Forced sexual activity: None   Other Topics Concern    None   Social History Narrative    None         Family History:   History reviewed. No pertinent family history. REVIEW OF SYSTEMS:    Constitutional: Negative for chills, fatigue and fever. HENT: Negative for loss of hearing   Eyes: Negative for visual disturbance. Respiratory: Negative for  cough, chest tightness, shortness of breath and wheezing. Cardiovascular: Negative for chest pain, palpitations and exertional chest pressure  Gastrointestinal: Negative for constipation, diarrhea, nausea and vomiting. Musculoskeletal: Complains of right elbow pain and limited range of motion. Negative for gait problem, and joint swelling. Skin: Negative for rash and nonhealing wounds. Neurological: Negative for dizziness, syncope, light-headedness,    Hematological: Does not bruise/bleed easily. Psychiatric/Behavioral: Negative for agitation    PHYSICAL EXAM:      BP (!) 150/69   Pulse 90   Temp 98.2 °F (36.8 °C) (Oral)   Resp 16   Ht 6' 2\" (1.88 m)   Wt 185 lb (83.9 kg)   SpO2 100%   BMI 23.75 kg/m²  I      Eyes:  pupils equal, round and reactive to light and conjunctiva normal    Head/ENT:  Normocephalic, without obvious abnormality, atramatic,     Neck:  Supple, symmetrical, trachea midline, no adenopathy, thyroid symmetric, not enlarged and no tenderness, skin warm and dry.     Heart: regular rate and rhythm, no murmur    Lungs:  No increased work of breathing, good air exchange, clear to auscultation

## 2020-09-14 NOTE — BRIEF OP NOTE
Brief Postoperative Note      Patient: Gallo Tony  YOB: 1995  MRN: [id number]    Date of Procedure: 9/14/2020    Pre-Op Diagnosis: subacute Closed olecranon fracture, right, initial encounter [S52.021A    Post-Op Diagnosis: Same       Procedure(s):  OPERATIVE FIXATION WITH OPEN REDUCTION INTERNAL FIXATION OF RIGHT OLECRANON FRACTURE    Surgeon(s):  Best Horne MD    Assistant:  Surgical Assistant: Hildegard Speaker Holter; Mertie Finer, RN    Anesthesia: General, regional    Estimated Blood Loss (mL): 851NW    Complications: None immediate apparent    Specimens:   none  Implants:  Biomet olecranon plate with associated locking and nonlocking screws    Drains: none    Findings: see fully dictated operative report    Electronically signed by Ida Messer MD on 9/14/2020 at 5:25 PM

## 2020-09-14 NOTE — ANESTHESIA PROCEDURE NOTES
Peripheral Block    Patient location during procedure: pre-op  Start time: 9/14/2020 12:40 PM  End time: 9/14/2020 12:50 PM  Staffing  Anesthesiologist: Carlos Broderick DO  Performed: anesthesiologist   Preanesthetic Checklist  Completed: patient identified, site marked, surgical consent, pre-op evaluation, timeout performed, IV checked, risks and benefits discussed, monitors and equipment checked, anesthesia consent given, oxygen available and patient being monitored  Peripheral Block  Patient position: supine  Prep: ChloraPrep  Patient monitoring: cardiac monitor, continuous pulse ox, frequent blood pressure checks and IV access  Block type: Brachial plexus  Laterality: right  Injection technique: single-shot  Procedures: ultrasound guided  Local infiltration: ropivacaine  Supraclavicular  Provider prep: mask and sterile gloves  Local infiltration: ropivacaine  Needle  Needle type: short-bevel   Needle gauge: 22 G  Needle length: 5 cm  Needle localization: ultrasound guidance  Assessment  Injection assessment: negative aspiration for heme, no paresthesia on injection and local visualized surrounding nerve on ultrasound  Hemodynamics: stable  Additional Notes  With 4 mg decadron   Medications Administered  Ropivacaine (NAROPIN) injection 0.5%, 30 mL  Reason for block: procedure for pain, post-op pain management and at surgeon's request

## 2020-09-14 NOTE — H&P
I have reviewed the history and physical and examined the patient and find no relevant changes. I have reviewed with the patient and/or family the risks, benefits, and alternatives to the procedure.     Gogo Gabriel MD  9/14/2020

## 2020-09-14 NOTE — PROGRESS NOTES
Anesthesia here to do block. right supraclavicular block  O2 Cris@[employer]  Start time:1246  Stop time:1255  Tolerated well    See flow sheet for vital signs.

## 2020-09-15 ENCOUNTER — TELEPHONE (OUTPATIENT)
Dept: ORTHOPEDIC SURGERY | Age: 25
End: 2020-09-15

## 2020-09-15 NOTE — PROGRESS NOTES
After resting, patients wave of nausea went away. Patient assisted to bathroom and wheelchair to the car. Patient stated, \"I am ready to go home. \" all vitals stable.

## 2020-09-15 NOTE — PROGRESS NOTES
PACU Discharge Note    Current Allergies: Patient has no known allergies. Pt meets criteria for discharge to home per Robin Score and ASPAN standards. Discharge instructions reviewed with patient and family. Both verbalized understanding of instructions. Gave patient and family opportunity to ask questions. All questions reviewed and answered. Documents signed and copy of discharge instructions given. Vitals:    09/14/20 2000   BP: (!) 146/86   Pulse: 110   Resp: 26   Temp: 98.9 °F (37.2 °C)   SpO2: 97%      BP within 20% of pt's admitting BP per ROBIN SCORE      Intake/Output Summary (Last 24 hours) at 9/14/2020 2059  Last data filed at 9/14/2020 1556  Gross per 24 hour   Intake 1000 ml   Output --   Net 1000 ml         Pain assessment:  none  Pain Level: 0    Offered patient opportunity to use restroom prior to discharge. Patient yes    Patient discharged to home/self care.  Patient discharged via wheel chair by transporter/RN to waiting family/S.O.       9/14/2020 8:59 PM

## 2020-09-15 NOTE — PROGRESS NOTES
Dr. Brooke Dixon called about drainage through ace wrap, dressing reinforced. Patient dressed and IV taken out. Patient siitting on side of bed, with no issue. All of a sudden patient became nauseated.  Patient assisted back to a resting position

## 2020-09-15 NOTE — FLOWSHEET NOTE
Encouraged patient to elevate and ice the extremity. Also to contact Dr Suzanne Doll about the poor pain control.     No other complaints

## 2020-09-15 NOTE — ANESTHESIA POSTPROCEDURE EVALUATION
Department of Anesthesiology  Postprocedure Note    Patient: Katrina Parson  MRN: [id number]  YOB: 1995  Date of evaluation: 9/14/2020  Time:  9:07 PM     Procedure Summary     Date:  09/14/20 Room / Location:  [address] [place] [address] [address] [place] [address]    Anesthesia Start:  1300 Anesthesia Stop:  1728    Procedure:  OPERATIVE FIXATION WITH OPEN REDUCTION INTERNAL FIXATION OF RIGHT OLECRANON FRACTURE (Right ) Diagnosis:       Closed olecranon fracture, right, initial encounter      (Closed olecranon fracture, right, initial encounter [D22.533A)    Surgeon:  Marianna Osgood, MD Responsible Provider:  Dewayne Bedolla MD    Anesthesia Type:  general ASA Status:  2          Anesthesia Type: No value filed. Enma Phase I: Enma Score: 10    Enma Phase II:      Last vitals: Reviewed and per EMR flowsheets.        Anesthesia Post Evaluation    Patient location during evaluation: PACU  Patient participation: complete - patient participated  Level of consciousness: awake and alert  Pain score: 0  Airway patency: patent  Nausea & Vomiting: no nausea and no vomiting  Complications: no  Cardiovascular status: hemodynamically stable  Respiratory status: acceptable  Hydration status: euvolemic

## 2020-09-22 ENCOUNTER — TELEPHONE (OUTPATIENT)
Dept: ORTHOPEDIC SURGERY | Age: 25
End: 2020-09-22

## 2020-09-22 ENCOUNTER — OFFICE VISIT (OUTPATIENT)
Dept: ORTHOPEDIC SURGERY | Age: 25
End: 2020-09-22
Payer: COMMERCIAL

## 2020-09-22 PROCEDURE — L3760 EO ADJ JT PREFAB CUSTOM FIT: HCPCS | Performed by: ORTHOPAEDIC SURGERY

## 2020-09-22 PROCEDURE — 99024 POSTOP FOLLOW-UP VISIT: CPT | Performed by: ORTHOPAEDIC SURGERY

## 2020-09-22 NOTE — PROGRESS NOTES
Chief Complaint:  Elbow Pain (R ELBOW )      History of Present of Illness:  Mr. Abhay Wetzel is a 62-year-old male presenting as a first postoperative visit after his right elbow surgery  Procedure: Open reduction internal fixation of subacute right olecranon fracture  Date of procedure: 9/14/2020  Patient is now 8 days status post surgery. He has noticed some drainage of small amount of blood from his wound. Otherwise his pain is been well controlled. No fever chills or other constitutional symptoms. He does state that he removed his splint after the surgery and his dressing but has been trying to protect his elbow otherwise. He denies any additional new complaints or changes today since the surgery    Examination:  General: Pleasant well-appearing no acute distress  Right elbow/right upper extremity  Incision appears clean posterior elbow with mild appropriate tommy-incisional swelling no erythema fluctuance or drainage  No evidence of hematoma formation  Sutures in place with healing incision  Compartments soft and compressible throughout the remainder of the forearm and wrist and hand  Appropriate finger and wrist tenodesis and cascade with 5 out of 5 AIN/PIN/interossei  5 out of 5 finger flexion extension abduction  Gross sensation intact to light touch median ulnar and radial nerve throughout the hand  2+ palpable radial pulse with brisk capillary refill all fingers  Range of motion of elbow tested today with no crepitus 60 degrees of pronation and supination, elbow range of motion 45 to 100 degrees of flexion with no crepitus and mild discomfort with terminal flexion and extension    Radiology:     X-rays obtained and reviewed in office:  Views 3  Location right elbow  Impression: Appropriate alignment of olecranon fracture with hardware in place. No evidence of joint subluxation or dislocation with concentric appearing ulnohumeral joint.   No obvious step-off with what appears to be approximately 1 mm of intra-articular gap. No additional osseous abnormality    Orders Placed This Encounter   Procedures    XR ELBOW RIGHT (MIN 3 VIEWS)       Impression:  Encounter Diagnosis   Name Primary?  Closed fracture of right olecranon process, initial encounter Yes         Treatment Plan:  I discussed with Arianna Hoist today the importance of continue to protect his elbow but beginning some gentle range of motion guided by therapy. We will plan to reevaluate him closely and follow-up in 1 week for planned suture removal.  Okay for upper extremity hygiene with soap and water for now. He will be fitted for hinged elbow brace today for protection locked at approximately 45 to 60 degrees in a position of comfort but okay to unlock per triceps tendon/olecranon repair protocol starting with flexion to 90 to 100 degrees and progressing with active assist flexion over the next 3 to 4 weeks. He did have preoperative stiffness secondary to the subacute nature of his injury I would therefore like to continue to slowly progress his motion and extension as well but he understands the possibility of some residual stiffness. No formal strengthening or active extension otherwise today as we discussed.   Plan for follow-up in 1 week for repeat evaluation

## 2020-09-22 NOTE — TELEPHONE ENCOUNTER
9/22/20 DME   - NO PRECERT REQUIRED FOR IN NETWORK PROVIDERS AND LESS THAN $750 - PER [facility] GUIDELINES/NOTES -  MP

## 2020-09-29 ENCOUNTER — OFFICE VISIT (OUTPATIENT)
Dept: ORTHOPEDIC SURGERY | Age: 25
End: 2020-09-29

## 2020-09-29 PROCEDURE — 99024 POSTOP FOLLOW-UP VISIT: CPT | Performed by: ORTHOPAEDIC SURGERY

## 2020-09-29 NOTE — PROGRESS NOTES
Chief Complaint:  No chief complaint on file. History of Present of Illness:  Mr. Burlene Schilder is a 42-year-old male presenting as  a repeat scheduled postoperative visit after his right elbow surgery  Procedure: Open reduction internal fixation of subacute right olecranon fracture  Date of procedure: 9/14/2020  He presents today 2 weeks status post surgery  He is here today mainly for wound check and suture removal  The patient has been in a hinged elbow brace since his last visit. He denies any new events since his last visit    Examination:  General: Pleasant well-appearing no acute distress  Right elbow/right upper extremity  Incision clean posterior elbow with healed wound and sutures in place and minimal swelling  No evidence of hematoma formation  No erythema or additional skin changes  Compartments soft and compressible throughout the remainder of the forearm and wrist and hand  Appropriate finger and wrist tenodesis and cascade with 5 out of 5 AIN/PIN/interossei  5 out of 5 finger flexion extension abduction  Gross sensation intact to light touch median ulnar and radial nerve throughout the hand  2+ palpable radial pulse with brisk capillary refill all fingers  Range of motion of elbow tested today with no crepitus 70 degrees of pronation and supination, elbow range of motion 35 to 110 degrees of flexion with no crepitus and mild discomfort with terminal flexion and extension    Radiology:     X-rays obtained and reviewed in office:  3 views of the right elbow: Appropriate alignment of olecranon and hardware with no change in alignment compared with prior images. Appropriate alignment of joint with comminuted fracture again visualized and no evidence of additional osseous abnormality    No orders of the defined types were placed in this encounter. Impression:  No diagnosis found. Treatment Plan:  I spoke with Suzanna today regarding his x-rays and healing.   We will plan for suture removal today

## 2021-01-26 ENCOUNTER — OFFICE VISIT (OUTPATIENT)
Dept: ORTHOPEDIC SURGERY | Age: 26
End: 2021-01-26
Payer: COMMERCIAL

## 2021-01-26 DIAGNOSIS — S52.021A CLOSED FRACTURE OF OLECRANON PROCESS OF RIGHT ULNA, INITIAL ENCOUNTER: Primary | ICD-10-CM

## 2021-01-26 PROCEDURE — G8427 DOCREV CUR MEDS BY ELIG CLIN: HCPCS | Performed by: ORTHOPAEDIC SURGERY

## 2021-01-26 PROCEDURE — 4004F PT TOBACCO SCREEN RCVD TLK: CPT | Performed by: ORTHOPAEDIC SURGERY

## 2021-01-26 PROCEDURE — 99213 OFFICE O/P EST LOW 20 MIN: CPT | Performed by: ORTHOPAEDIC SURGERY

## 2021-01-26 PROCEDURE — G8420 CALC BMI NORM PARAMETERS: HCPCS | Performed by: ORTHOPAEDIC SURGERY

## 2021-01-26 PROCEDURE — G8484 FLU IMMUNIZE NO ADMIN: HCPCS | Performed by: ORTHOPAEDIC SURGERY

## 2021-01-26 NOTE — PROGRESS NOTES
Assessment: 58-year-old male status post ORIF right subacute olecranon fracture 9/14/2020    Treatment Plan: The patient appears to be healing and recovering well today. He demonstrates radiographic and clinical evidence of healing. He has done an excellent job of regaining his range of motion as he was quite stiff preoperatively secondary to subacute nature of his injury prior to his fixation and being evaluated  At this point I think he can continue to progress his activity as tolerated but I did discuss incremental progression particularly of his triceps strengthening  We did discuss some of his hardware irritation and we will monitor the symptoms with the possibility in the long-term future considering hardware removal as needed. I did offer him a follow-up in the next 3 to 6 months for repeat evaluation with no plans for repeat imaging unless indicated    No follow-ups on file. History of Present Illness  Mr. Sy Jesus a 58-year-old male presenting as  a repeat scheduled postoperative visit after his right elbow surgery  Procedure: Open reduction internal fixation of subacute right olecranon fracture  Date of procedure: 9/14/2020    He presents today 4 and half months status post surgery  The patient is here today for follow-up and has been approximately 4 months since last fall  He feels that his elbow is doing well. He has had some tenderness on the posterior aspect of his elbow and proximal forearm near the incision and site of hardware probably over the last 3 weeks. He has been increasing his activity including doing exercising and pushing push-ups as well as more strengthening activities. He did complete 8 weeks of therapy per his history  No other new events or complaints today  Review of Systems  Pertinent items are noted in HPI  Review of systems reviewed from Patient History Form dated on 9/14/2020 and available in the patient's chart under the Media tab.           Vital Signs  There were no vitals filed for this visit. Physical Exam  Constitutional:  Patient is well-nourished and demonstrates normal hygiene. Mental Status:  Patient is alert and oriented to person, place and time. Skin:  Intact, no rashes or lesions. General: Pleasant well-appearing no acute distress  Right elbow/right upper extremity  Well-healed incision with slightly hypertrophic scar at approximately    No evidence of hematoma formation  No erythema or additional skin changes  Compartments soft and compressible throughout the remainder of the forearm and wrist and hand  Appropriate finger and wrist tenodesis and cascade with 5 out of 5 AIN/PIN/interossei  5 out of 5 finger flexion extension abduction  Gross sensation intact to light touch median ulnar and radial nerve throughout the hand  2+ palpable radial pulse with brisk capillary refill all fingers  Range of motion of elbow tested today with no crepitus  75 degrees of pronation and supination, elbow range of motion 10 to 140 degrees of flexion with no crepitus and no discomfort with terminal flexion and extension  No additional mechanical symptoms throughout elbow range of motion  Along the proximal ulna near the distal aspect of the plate and proximal aspect of the plate and near triceps insertion there is some minimal pinpoint tenderness.   No tenderness globally throughout the remainder of the elbow    Capillary refill brisk all fingers, symmetric  Gross sensation intact to light touch median/ulnar/radial nerves  Sensation intact to radial/ulnar aspect of fingertip        Radiology:    X-rays obtained and reviewed in office:  Views 3  Location right elbow  Impression there appears to be healed olecranon fracture with no new intra-articular step-off or gap  No change in hardware position or screw alignment  No new degenerative changes loose body or additional osseous abnormality    Additional Diagnostic Test Findings:    Office Procedures:  Orders Placed This Encounter   Procedures    XR ELBOW LEFT (MIN 3 VIEWS)     Standing Status:   Future     Number of Occurrences:   1     Standing Expiration Date:   1/26/2022           Tanvi Taylor MD  Orthopaedic Surgeon, [address]    Contact Information:  Nadira Rodriguez: [phone] Clinical )    This dictation was performed with a verbal recognition program [facility]) and it was checked for errors. It is possible that there are still dictated errors within this office note. If so, please bring any errors to my attention for an addendum. All efforts were made to ensure that this office note is accurate.

## 2021-07-04 ENCOUNTER — HOSPITAL ENCOUNTER (EMERGENCY)
Age: 26
Discharge: HOME | End: 2021-07-04
Attending: EMERGENCY MEDICINE
Payer: COMMERCIAL

## 2021-07-04 VITALS
OXYGEN SATURATION: 97 % | BODY MASS INDEX: 26.31 KG/M2 | HEIGHT: 74 IN | TEMPERATURE: 98 F | HEART RATE: 103 BPM | WEIGHT: 205 LBS | SYSTOLIC BLOOD PRESSURE: 136 MMHG | DIASTOLIC BLOOD PRESSURE: 86 MMHG | RESPIRATION RATE: 16 BRPM

## 2021-07-04 DIAGNOSIS — J02.9 ACUTE PHARYNGITIS, UNSPECIFIED ETIOLOGY: Primary | ICD-10-CM

## 2021-07-04 LAB
MONO TEST: NEGATIVE
S PYO AG THROAT QL: NEGATIVE

## 2021-07-04 PROCEDURE — 87880 STREP A ASSAY W/OPTIC: CPT

## 2021-07-04 PROCEDURE — 86308 HETEROPHILE ANTIBODY SCREEN: CPT

## 2021-07-04 PROCEDURE — 6370000000 HC RX 637 (ALT 250 FOR IP): Performed by: EMERGENCY MEDICINE

## 2021-07-04 PROCEDURE — 36415 COLL VENOUS BLD VENIPUNCTURE: CPT

## 2021-07-04 PROCEDURE — 96375 TX/PRO/DX INJ NEW DRUG ADDON: CPT

## 2021-07-04 PROCEDURE — 87081 CULTURE SCREEN ONLY: CPT

## 2021-07-04 PROCEDURE — 96374 THER/PROPH/DIAG INJ IV PUSH: CPT

## 2021-07-04 PROCEDURE — 87077 CULTURE AEROBIC IDENTIFY: CPT

## 2021-07-04 PROCEDURE — 99283 EMERGENCY DEPT VISIT LOW MDM: CPT

## 2021-07-04 PROCEDURE — 6360000002 HC RX W HCPCS: Performed by: EMERGENCY MEDICINE

## 2021-07-04 RX ORDER — ACETAMINOPHEN 325 MG/1
650 TABLET ORAL ONCE
Status: COMPLETED | OUTPATIENT
Start: 2021-07-04 | End: 2021-07-04

## 2021-07-04 RX ORDER — KETOROLAC TROMETHAMINE 30 MG/ML
15 INJECTION, SOLUTION INTRAMUSCULAR; INTRAVENOUS ONCE
Status: DISCONTINUED | OUTPATIENT
Start: 2021-07-04 | End: 2021-07-04

## 2021-07-04 RX ORDER — NAPROXEN 500 MG/1
500 TABLET ORAL 2 TIMES DAILY WITH MEALS
Qty: 20 TABLET | Refills: 0 | Status: SHIPPED | OUTPATIENT
Start: 2021-07-04 | End: 2021-08-31

## 2021-07-04 RX ORDER — DEXAMETHASONE SODIUM PHOSPHATE 10 MG/ML
8 INJECTION, SOLUTION INTRAMUSCULAR; INTRAVENOUS ONCE
Status: COMPLETED | OUTPATIENT
Start: 2021-07-04 | End: 2021-07-04

## 2021-07-04 RX ORDER — ACETAMINOPHEN 500 MG
500 TABLET ORAL EVERY 4 HOURS PRN
Qty: 60 TABLET | Refills: 0 | Status: SHIPPED | OUTPATIENT
Start: 2021-07-04 | End: 2021-08-31

## 2021-07-04 RX ORDER — CLINDAMYCIN HYDROCHLORIDE 300 MG/1
300 CAPSULE ORAL 4 TIMES DAILY
Qty: 40 CAPSULE | Refills: 0 | Status: SHIPPED | OUTPATIENT
Start: 2021-07-04 | End: 2021-07-14

## 2021-07-04 RX ORDER — KETOROLAC TROMETHAMINE 30 MG/ML
15 INJECTION, SOLUTION INTRAMUSCULAR; INTRAVENOUS ONCE
Status: COMPLETED | OUTPATIENT
Start: 2021-07-04 | End: 2021-07-04

## 2021-07-04 RX ORDER — DEXAMETHASONE SODIUM PHOSPHATE 10 MG/ML
8 INJECTION, SOLUTION INTRAMUSCULAR; INTRAVENOUS ONCE
Status: DISCONTINUED | OUTPATIENT
Start: 2021-07-04 | End: 2021-07-04

## 2021-07-04 RX ADMIN — ACETAMINOPHEN 650 MG: 325 TABLET ORAL at 10:07

## 2021-07-04 RX ADMIN — KETOROLAC TROMETHAMINE 15 MG: 30 INJECTION, SOLUTION INTRAMUSCULAR; INTRAVENOUS at 10:01

## 2021-07-04 RX ADMIN — DEXAMETHASONE SODIUM PHOSPHATE 8 MG: 10 INJECTION, SOLUTION INTRAMUSCULAR; INTRAVENOUS at 10:01

## 2021-07-04 ASSESSMENT — PAIN SCALES - GENERAL
PAINLEVEL_OUTOF10: 6

## 2021-07-04 ASSESSMENT — PAIN DESCRIPTION - LOCATION: LOCATION: THROAT

## 2021-07-04 NOTE — ED PROVIDER NOTES
EMERGENCY DEPARTMENT PROVIDER NOTE    Patient Identification  Pt Name: Stephen Purvis  MRN: [id number]  [place] 1995  Date of evaluation: 7/4/2021  Provider: Beatriz Ramirez DO  PCP: Sylvia Landeros MD    Chief Complaint  Pharyngitis (pt c/o sore throat x 4 days.)      HPI  (History provided by patient)  This is a 31 y.o. male with pertinent past medical history of mononucleosis at age 14 who was brought in by self for sore throat ongoing for the past 4 days. Worsened with speaking and swallowing, nothing seems to make it better. Also associated with subjective fevers. Patient reports pus coming from his left tonsil. Patient had a telehealth visit and was prescribed amoxicillin, he is on day 4 of this antibiotic without any improvement. He denies any cough, chest pain or shortness of breath. ROS    Const:  +fevers, +chills  Skin:  No rash, no lesions  ENT:  +sore throat, no difficulty swallowing, no ear pain, no sinus pain or congestion  Card:  No chest pain, no palpitations  Resp:  No shortness of breath, no cough  Abd:  No abdominal pain, no nausea, no vomiting  MSK:  No joint pain, no myalgia  Neuro:  No focal weakness, no headache    All other systems reviewed and negative unless otherwise noted in HPI        I have reviewed the following nursing documentation:  Allergies: Patient has no known allergies. Past medical history: History reviewed. No pertinent past medical history. Past surgical history:   Past Surgical History:   Procedure Laterality Date    ELBOW FRACTURE SURGERY Right 9/14/2020    OPERATIVE FIXATION WITH OPEN REDUCTION INTERNAL FIXATION OF RIGHT OLECRANON FRACTURE performed by Janae Larsen MD at [address] [facility] medications:   Discharge Medication List as of 7/4/2021 11:13 AM          Social history:  reports that he has been smoking. He has never used smokeless tobacco. He reports that he does not drink alcohol and does not use drugs. Family history:  History reviewed. No pertinent family history. Exam  ED Triage Vitals [07/04/21 0944]   BP Temp Temp Source Pulse Resp SpO2 Height Weight   136/86 99.5 °F (37.5 °C) Oral 104 16 98 % 6' 2\" (1.88 m) 205 lb (93 kg)     Nursing note and vitals reviewed. Constitutional: Well developed, well nourished. Non-toxic in appearance. HENT:      Head: Normocephalic and atraumatic. Ears: External ears normal.      Nose: Nose normal.     Mouth: Membrane mucosa moist and pink. Posterior oropharynx is injected with bilateral white tonsillar exudate and 1+ tonsillar hypertrophy. Uvula is midline. Speech is clear and tolerating oral secretions. Eyes: Anicteric sclera. No discharge. Neck: Supple. Trachea midline. Tender left anterior cervical lymphadenopathy, no posterior auricular or occipital LA  Cardiovascular: Mild tachycardia, regular rhythm; no murmurs, rubs, or gallops. Pulmonary/Chest: Effort normal. No respiratory distress. CTAB. No stridor. No wheezes. No rales. Musculoskeletal: Moves all extremities. No gross deformity. Neurological: Alert and oriented. Face symmetric. Speech is clear. Skin: Warm and dry. No rash. Radiology  No orders to display       Labs  Results for orders placed or performed during the hospital encounter of 07/04/21   Strep screen group a throat    Specimen: Throat   Result Value Ref Range    Rapid Strep A Screen Negative Negative   Mononucleosis screen   Result Value Ref Range    Mono Test Negative Negative       Screenings           MDM and ED Course    Patient febrile however nontoxic. Overall he is well-appearing. No evidence of abscess or upper airway edema on exam.  Nothing to suggest Conner's. Rapid strep and Monospot are negative. Patient received oral fluids, IV Toradol and Decadron. On my reevaluation he reported symptomatic improvement. He remained with clear speech and tolerating his oral secretions. Given lack of improvement on amoxicillin will switch to clindamycin.   We will continue anti-inflammatories for pain. Felt safe for discharge to self-care with close PCP follow-up. Patient is agreeable with plan and feels comfortable returning to home. Strict return precautions discussed. Final Impression  1. Acute pharyngitis, unspecified etiology        Blood pressure 136/86, pulse 103, temperature 98 °F (36.7 °C), temperature source Infrared, resp. rate 16, height 6' 2\" (1.88 m), weight 205 lb (93 kg), SpO2 97 %. Disposition:  DISPOSITION Decision To Discharge 07/04/2021 11:08:10 AM      Patient Referrals:  Enrique Gaytan MD  [address] [facility] [address]  [phone]    In 3 days      [facility]) Pre-Services  [phone]          Discharge Medications:  Discharge Medication List as of 7/4/2021 11:13 AM      START taking these medications    Details   naproxen (NAPROSYN) 500 MG tablet Take 1 tablet by mouth 2 times daily (with meals) for 10 days, Disp-20 tablet, R-0Print      acetaminophen (TYLENOL) 500 MG tablet Take 1 tablet by mouth every 4 hours as needed for Pain or Fever, Disp-60 tablet, R-0Print      clindamycin (CLEOCIN) 300 MG capsule Take 1 capsule by mouth 4 times daily for 10 days, Disp-40 capsule, R-0Print             Discontinued Medications:  Discharge Medication List as of 7/4/2021 11:13 AM          This chart was generated using the [address] [place] [address] dictation system. I created this record but it may contain dictation errors given the limitations of this technology.     Teo Valdez DO (electronically signed)  Attending Emergency Physician       Teo Valdez DO  07/04/21 7612

## 2021-07-06 LAB
ORGANISM: ABNORMAL
S PYO THROAT QL CULT: ABNORMAL
S PYO THROAT QL CULT: ABNORMAL

## 2021-08-31 ENCOUNTER — APPOINTMENT (OUTPATIENT)
Dept: GENERAL RADIOLOGY | Age: 26
End: 2021-08-31
Payer: COMMERCIAL

## 2021-08-31 ENCOUNTER — HOSPITAL ENCOUNTER (EMERGENCY)
Age: 26
Discharge: HOME | End: 2021-08-31
Attending: EMERGENCY MEDICINE
Payer: COMMERCIAL

## 2021-08-31 VITALS
WEIGHT: 195 LBS | HEIGHT: 75 IN | DIASTOLIC BLOOD PRESSURE: 82 MMHG | OXYGEN SATURATION: 100 % | TEMPERATURE: 98.1 F | HEART RATE: 81 BPM | SYSTOLIC BLOOD PRESSURE: 156 MMHG | RESPIRATION RATE: 18 BRPM | BODY MASS INDEX: 24.25 KG/M2

## 2021-08-31 DIAGNOSIS — R07.9 CHEST PAIN, UNSPECIFIED TYPE: Primary | ICD-10-CM

## 2021-08-31 LAB
A/G RATIO: 1.5 (ref 1.1–2.2)
ALBUMIN SERPL-MCNC: 4.8 G/DL (ref 3.4–5)
ALP BLD-CCNC: 68 U/L (ref 40–129)
ALT SERPL-CCNC: 16 U/L (ref 10–40)
ANION GAP SERPL CALCULATED.3IONS-SCNC: 10 MMOL/L (ref 3–16)
AST SERPL-CCNC: 16 U/L (ref 15–37)
BASOPHILS ABSOLUTE: 0.1 K/UL (ref 0–0.2)
BASOPHILS RELATIVE PERCENT: 1.1 %
BILIRUB SERPL-MCNC: 0.4 MG/DL (ref 0–1)
BUN BLDV-MCNC: 18 MG/DL (ref 7–20)
CALCIUM SERPL-MCNC: 9.7 MG/DL (ref 8.3–10.6)
CHLORIDE BLD-SCNC: 104 MMOL/L (ref 99–110)
CO2: 27 MMOL/L (ref 21–32)
CREAT SERPL-MCNC: 0.9 MG/DL (ref 0.9–1.3)
D DIMER: <200 NG/ML DDU (ref 0–229)
EKG ATRIAL RATE: 78 BPM
EKG DIAGNOSIS: NORMAL
EKG P AXIS: 76 DEGREES
EKG P-R INTERVAL: 140 MS
EKG Q-T INTERVAL: 346 MS
EKG QRS DURATION: 96 MS
EKG QTC CALCULATION (BAZETT): 394 MS
EKG R AXIS: 67 DEGREES
EKG T AXIS: 18 DEGREES
EKG VENTRICULAR RATE: 78 BPM
EOSINOPHILS ABSOLUTE: 0.1 K/UL (ref 0–0.6)
EOSINOPHILS RELATIVE PERCENT: 1.3 %
GFR AFRICAN AMERICAN: >60
GFR NON-AFRICAN AMERICAN: >60
GLOBULIN: 3.2 G/DL
GLUCOSE BLD-MCNC: 95 MG/DL (ref 70–99)
HCT VFR BLD CALC: 49.8 % (ref 40.5–52.5)
HEMOGLOBIN: 16.6 G/DL (ref 13.5–17.5)
LYMPHOCYTES ABSOLUTE: 1.3 K/UL (ref 1–5.1)
LYMPHOCYTES RELATIVE PERCENT: 23.8 %
MCH RBC QN AUTO: 28.3 PG (ref 26–34)
MCHC RBC AUTO-ENTMCNC: 33.3 G/DL (ref 31–36)
MCV RBC AUTO: 85.1 FL (ref 80–100)
MONOCYTES ABSOLUTE: 0.5 K/UL (ref 0–1.3)
MONOCYTES RELATIVE PERCENT: 9 %
NEUTROPHILS ABSOLUTE: 3.6 K/UL (ref 1.7–7.7)
NEUTROPHILS RELATIVE PERCENT: 64.8 %
PDW BLD-RTO: 13.9 % (ref 12.4–15.4)
PLATELET # BLD: 178 K/UL (ref 135–450)
PMV BLD AUTO: 7.9 FL (ref 5–10.5)
POTASSIUM REFLEX MAGNESIUM: 4.3 MMOL/L (ref 3.5–5.1)
PRO-BNP: 16 PG/ML (ref 0–124)
RBC # BLD: 5.86 M/UL (ref 4.2–5.9)
SODIUM BLD-SCNC: 141 MMOL/L (ref 136–145)
TOTAL PROTEIN: 8 G/DL (ref 6.4–8.2)
TROPONIN: <0.01 NG/ML
WBC # BLD: 5.6 K/UL (ref 4–11)

## 2021-08-31 PROCEDURE — 36415 COLL VENOUS BLD VENIPUNCTURE: CPT

## 2021-08-31 PROCEDURE — 71046 X-RAY EXAM CHEST 2 VIEWS: CPT

## 2021-08-31 PROCEDURE — 85025 COMPLETE CBC W/AUTO DIFF WBC: CPT

## 2021-08-31 PROCEDURE — 83880 ASSAY OF NATRIURETIC PEPTIDE: CPT

## 2021-08-31 PROCEDURE — 84484 ASSAY OF TROPONIN QUANT: CPT

## 2021-08-31 PROCEDURE — U0005 INFEC AGEN DETEC AMPLI PROBE: HCPCS

## 2021-08-31 PROCEDURE — 93010 ELECTROCARDIOGRAM REPORT: CPT | Performed by: INTERNAL MEDICINE

## 2021-08-31 PROCEDURE — U0003 INFECTIOUS AGENT DETECTION BY NUCLEIC ACID (DNA OR RNA); SEVERE ACUTE RESPIRATORY SYNDROME CORONAVIRUS 2 (SARS-COV-2) (CORONAVIRUS DISEASE [COVID-19]), AMPLIFIED PROBE TECHNIQUE, MAKING USE OF HIGH THROUGHPUT TECHNOLOGIES AS DESCRIBED BY CMS-2020-01-R: HCPCS

## 2021-08-31 PROCEDURE — 99283 EMERGENCY DEPT VISIT LOW MDM: CPT

## 2021-08-31 PROCEDURE — 80053 COMPREHEN METABOLIC PANEL: CPT

## 2021-08-31 PROCEDURE — 93005 ELECTROCARDIOGRAM TRACING: CPT | Performed by: PHYSICIAN ASSISTANT

## 2021-08-31 PROCEDURE — 85379 FIBRIN DEGRADATION QUANT: CPT

## 2021-08-31 ASSESSMENT — PAIN SCALES - GENERAL: PAINLEVEL_OUTOF10: 1

## 2021-08-31 ASSESSMENT — PAIN DESCRIPTION - PAIN TYPE: TYPE: ACUTE PAIN

## 2021-08-31 ASSESSMENT — PAIN DESCRIPTION - LOCATION: LOCATION: CHEST;HEAD

## 2021-08-31 NOTE — ED NOTES
Discharge instructions and prescriptions reviewed with pt. Pt allowed opportunity to ask questions and verbalized understanding.         Stacy Patel RN  08/31/21 [id number]

## 2021-08-31 NOTE — ED PROVIDER NOTES
I independently performed a history and physical on [address] All diagnostic, treatment, and disposition decisions were made by myself in conjunction with the advanced practice provider. Briefly, this is a 31 y.o. male here for elevated BP. He took a short-term course of some illicit anabolic steroids few months ago has felt little bit off ever since. No nausea no diarrhea no fevers chills sweats dizziness palpitations since exertional dyspnea and occasional chest discomfort. On exam, is well-appearing male no acute distress heart regular with soft posterior history. EKG  Dated today, 1424  Rate 78, NSR, NSWT changes and LVH pattern. No prior. Screenings                   MDM  Hypertension s/p recent anabolic steroid abuse and chest pain. Labs/imaging benign  LVH on EKG. Needs to establish PCP for followup  With LVH and elevated BP, I'd start him on metoprolol at 25mg/day. Refer to cards. Patient Referrals:  [address] Cardiology - [address] [facility]  [phone]  Schedule an appointment as soon as possible for a visit in 3 days  For re-check    [facility] Emergency Department  [address] [place] [address]  [phone]    As needed      Discharge Medications:  New Prescriptions    METOPROLOL TARTRATE (LOPRESSOR) 25 MG TABLET    Take 1 tablet by mouth daily       FINAL IMPRESSION  1. Chest pain, unspecified type        Blood pressure (!) 156/82, pulse 81, temperature 98.1 °F (36.7 °C), temperature source Temporal, resp. rate 18, height 6' 3\" (1.905 m), weight 195 lb (88.5 kg), SpO2 100 %. For further details of Shavon Spence's emergency department encounter, please see documentation by advanced practice provider, Anne Dubon.        Alfredo Herndon MD  08/31/21 6459

## 2021-08-31 NOTE — ED PROVIDER NOTES
getting better. He had Covid last year but did not get his Covid vaccination. Currently does not have a family physician. Denies any other symptoms. Nursing Notes were all reviewed and agreed with or any disagreements were addressed in the HPI. REVIEW OF SYSTEMS    (2-9 systems for level 4, 10 or more for level 5)     Review of Systems    Positives and Pertinent negatives as per HPI. Except as noted above in the ROS, all other systems were reviewed and negative. PAST MEDICAL HISTORY   History reviewed. No pertinent past medical history. SURGICAL HISTORY     Past Surgical History:   Procedure Laterality Date    ELBOW FRACTURE SURGERY Right 9/14/2020    OPERATIVE FIXATION WITH OPEN REDUCTION INTERNAL FIXATION OF RIGHT OLECRANON FRACTURE performed by Annel Juarez MD at [place] [address]       Discharge Medication List as of 8/31/2021  3:17 PM            ALLERGIES     Patient has no known allergies. FAMILYHISTORY     History reviewed. No pertinent family history. SOCIAL HISTORY       Social History     Tobacco Use    Smoking status: Light Tobacco Smoker    Smokeless tobacco: Never Used   Substance Use Topics    Alcohol use: No    Drug use: No       SCREENINGS             PHYSICAL EXAM    (up to 7 for level 4, 8 or more for level 5)     ED Triage Vitals [08/31/21 1237]   BP Temp Temp Source Pulse Resp SpO2 Height Weight   (!) 156/82 98.1 °F (36.7 °C) Temporal 81 18 100 % 6' 3\" (1.905 m) 195 lb (88.5 kg)       Physical Exam  Vitals and nursing note reviewed. Constitutional:       Appearance: He is well-developed. He is not diaphoretic. HENT:      Head: Atraumatic. Nose: Nose normal.   Eyes:      General:         Right eye: No discharge. Left eye: No discharge. Cardiovascular:      Rate and Rhythm: Normal rate and regular rhythm. Heart sounds: No murmur heard. No friction rub. No gallop.     Pulmonary:      Effort: Pulmonary effort is normal. No respiratory distress. Breath sounds: No stridor. No wheezing, rhonchi or rales. Abdominal:      General: Bowel sounds are normal. There is no distension. Palpations: Abdomen is soft. There is no mass. Tenderness: There is no abdominal tenderness. There is no guarding or rebound. Hernia: No hernia is present. Musculoskeletal:         General: No swelling. Normal range of motion. Cervical back: Normal range of motion. Right lower leg: No edema. Left lower leg: No edema. Skin:     General: Skin is warm and dry. Findings: No erythema or rash. Neurological:      Mental Status: He is alert and oriented to person, place, and time. Cranial Nerves: No cranial nerve deficit. Psychiatric:         Behavior: Behavior normal.         DIAGNOSTIC RESULTS   LABS:    Labs Reviewed   CBC WITH AUTO DIFFERENTIAL    Narrative:     Performed at:  [facility] [address]   Phone [phone]   COMPREHENSIVE METABOLIC PANEL W/ REFLEX TO MG FOR LOW K    Narrative:     Performed at:  [facility] [address] [facility] [address] [place]   Phone [phone]   TROPONIN    Narrative:     Performed at:  [facility] [address] [facility] [place]  [place]   Phone [phone]   BRAIN NATRIURETIC PEPTIDE    Narrative:     Performed at:  [facility]  [place]   Phone [phone]   D-DIMER, QUANTITATIVE    Narrative:     Performed at:  [facility] [address]   Phone [phone]       When ordered only abnormal lab results are displayed. All other labs were within normal range or not returned as of this dictation. EKG:  When ordered, EKG's are interpreted by the Emergency Department Physician in the absence of a cardiologist. Please see their note for interpretation of EKG. RADIOLOGY:   Non-plain film images such as CT, Ultrasound and MRI are read by the radiologist. Plain radiographic images are visualized and preliminarily interpreted by the ED Provider with the below findings:        Interpretation per the Radiologist below, if available at the time of this note:    XR CHEST (2 VW)   Final Result   No acute abnormality                 PROCEDURES   Unless otherwise noted below, none     Procedures    CRITICAL CARE TIME   N/A    CONSULTS:  None      EMERGENCY DEPARTMENT COURSE and DIFFERENTIAL DIAGNOSIS/MDM:   Vitals:    Vitals:    08/31/21 1237   BP: (!) 156/82   Pulse: 81   Resp: 18   Temp: 98.1 °F (36.7 °C)   TempSrc: Temporal   SpO2: 100%   Weight: 195 lb (88.5 kg)   Height: 6' 3\" (1.905 m)       Patient was given the following medications:  Medications - No data to display        Patient presented with some intermittent chest pain and headaches. Recently finished a steroid cycle and has been trying to use Cialis to control his blood pressure at home. Does have some evidence of LVH on EKG. Laboratory testing here is unremarkable. D-dimer is less than 200. Low suspicion for subarachnoid hemorrhage, meningitis, encephalitis, pericarditis, myocarditis, acute coronary syndrome, pulmonary embolus or other emergent etiology. Was referred to cardiology clinic due to LVH noted on EKG for further work-up and treatment. Was also started on beta-blocker per attending recommendation. Patient was stable time of discharge. FINAL IMPRESSION      1.  Chest pain, unspecified type          DISPOSITION/PLAN   DISPOSITION Decision To Discharge 08/31/2021 03:15:56 PM      PATIENT REFERRED TO:  [address] [place] [address] Cardiology - [address]  [address] [id number] [phone]  Schedule an appointment as soon as possible for a visit in 3 days  For re-check    [facility] Emergency [facility]  [address] [facility] [address] [facility]  [phone]    As needed      DISCHARGE MEDICATIONS:  Discharge Medication List as of 8/31/2021  3:17 PM      START taking these medications    Details   metoprolol tartrate (LOPRESSOR) 25 MG tablet Take 1 tablet by mouth daily, Disp-10 tablet, R-0Print             DISCONTINUED MEDICATIONS:  Discharge Medication List as of 8/31/2021  3:17 PM      STOP taking these medications       naproxen (NAPROSYN) 500 MG tablet Comments:   Reason for Stopping:         acetaminophen (TYLENOL) 500 MG tablet Comments:   Reason for Stopping:                      (Please note that portions of this note were completed with a voice recognition program.  Efforts were made to edit the dictations but occasionally words are mis-transcribed.)    Jerrell Crawford PA-C (electronically signed)            Jerrell Crawford PA-C  08/31/21 5084

## 2021-09-01 LAB — SARS-COV-2: NOT DETECTED

## 2021-09-07 ENCOUNTER — TELEPHONE (OUTPATIENT)
Dept: PRIMARY CARE CLINIC | Age: 26
End: 2021-09-07

## 2021-09-07 NOTE — TELEPHONE ENCOUNTER
He would need to schedule an appointment as a new patient here or ask his PCP for a refill  He has never been seen here from what I see

## 2021-09-07 NOTE — TELEPHONE ENCOUNTER
Pt states he was at the hospital for chest pain and was given metoprolol. States he  was told to call here for a refill. Tried to schedule an appt, but he does not have health insurance.  He would like script sent to [facility]. .5  [phone]

## 2021-09-07 NOTE — TELEPHONE ENCOUNTER
Spoke with patient, he would like to schedule if he can get in to the office in the next 2 days. Lost call with patient, please call to schedule with BNN.

## 2021-09-14 NOTE — TELEPHONE ENCOUNTER
[address]
Pt was seen in [facility] ED on 8/31/21, Dx enlarged heart. Pt is requesting refill of Metoprolol Tartrate 25MG Tab 2 tabs twice daily. Pt states medication has helped with most symptoms  Chest pain, SOB, tension headaches from high BP. Pt ran out of medication 2 days and has noticed a significant increase in Sx. Pt doesn't want to go back to ED for refill, but is unable to get in with a provider for 3 weeks.   If possible can pt a temporary of medication until he can schedule ED FU with Dr. Mayuri Sandra: [facility]
Sent his metoprolol refill, schedule patient to re-establish care with me in 2 weeks
0

## 2021-09-27 NOTE — PROGRESS NOTES
[address]  9/28/21  Referring: Dr. Sunshine ref. provider found    REASON FOR CONSULT/CHIEF COMPLAINT/HPI     Reason for visit/ Chief complaint  New patient  Josue Barros   HPI Tali Ventura is a 31 y.o. seen today as a new patient in consult with Lalo Merino ([facility] ER doc) for chest pain, LVH seen on ekg. Healthy as a child. No hospitalizations. Able to keep up with his peers. No history of syncope. He smoked tobacco during the college years, no longer does this, and does not vape. He had covid in December( treated as outpatient, only down one day). Never vaccinated. Occasional alcohol use. No cardiac history in his family. He went to the ER 8/31 with 4 day history of chest pain and headaches. Ekg showed LVH He was started on lopressor 25 mg daily and referred to us. Today he states he is healthy and fit. He works out 6 days a week. He recently broke his elbow by a mechanical fall off of a ladder, he has been trying to get back in shape. He used illicit anabolic steroids in hopes to get in better shape. One month after he stopped. He had an episode where he felt like he could pass out after an intense workout. This relieved with rest and has not recurred. He had an elevated blood pressure a few months ago after he got off of a 4-5 week testosterone cycle. He was injecting 200 mg of testosterone twice a week for 4-5 weeks and ended about 3 months ago. He has random \"sharp jab\" in his chest, left of the sternum. Non-radiating. No associated diaphoresis. He wakes up pain free, and the chest pain gradually starts at 2 pm and progresses at the day goes on. The pain is exacerbated when his girlfriend is lying on his chest wall, if he stretches his arms, or stretches his arms. No associated shortness of breath palpitations or dizziness. No orthopnea or edema. He started asa and coenzyme q 10 because he heard it was \"heart healthy\".   He has been monitoring his blood pressure at home, in the morning his blood pressure is in the 140's, in the evenings his blood pressure is in the 160s. When his blood pressure is higher, he notices his vision is not as sharp. Denies palpitations, but can feel his heart beat at night when he lies down. Patient is compliant with medications and is tolerating them well without side effects     HISTORY/ALLERGIES/ROS     MedHx:  has no past medical history on file. SurgHx:  has a past surgical history that includes Elbow fracture surgery (Right, 9/14/2020). SocHx:  reports that he has never smoked. He has never used smokeless tobacco. He reports that he does not drink alcohol and does not use drugs. FamHx: No family history of premature coronary artery disease, sudden death, or aneurysm  Allergies: Patient has no known allergies. ROS:   Review of Systems   Constitutional: Negative for activity change, diaphoresis, fatigue and fever. HENT: Negative for congestion and ear discharge. Eyes: Negative for photophobia and visual disturbance. Respiratory: Positive for chest tightness. Negative for cough and shortness of breath. Cardiovascular: Negative for chest pain and palpitations. Gastrointestinal: Negative for abdominal distention, abdominal pain, blood in stool and nausea. Endocrine: Negative for cold intolerance and polydipsia. Genitourinary: Negative for difficulty urinating and flank pain. Musculoskeletal: Positive for arthralgias and myalgias. Skin: Negative for rash and wound. Allergic/Immunologic: Negative for environmental allergies and immunocompromised state. Neurological: Negative for dizziness and headaches. Hematological: Negative for adenopathy. Does not bruise/bleed easily. Psychiatric/Behavioral: Negative for confusion. The patient is not hyperactive. MEDICATIONS      Prior to Admission medications    Medication Sig Start Date End Date Taking?  Authorizing Provider   aspirin 81 MG EC tablet Take 81 mg by mouth daily   Yes Historical Provider, MD   Coenzyme Q10 (CO Q 10 PO) Take by mouth   Yes Historical Provider, MD   metoprolol tartrate (LOPRESSOR) 25 MG tablet Take 1 tablet by mouth 2 times daily 9/7/21  Yes Leonel Cruz MD       PHYSICAL EXAM        Vitals:    09/28/21 0733   BP: 136/80   Pulse: 82   SpO2: 99%    Weight: 197 lb (89.4 kg)     Gen Alert, cooperative, no distress, fit Heart  Regular rate and rhythm. No murmur   Head Normocephalic, atraumatic, no abnormalities Abd  Soft, NT, +BS, no mass, no OM   Eyes PERRLA, conj/corn clear Ext  Ext nl, AT, no C/C, no edema   Nose Nares normal, no drain age, Non-tender Pulse 2+ and symmetric   Throat Lips, mucosa, tongue normal Skin Color/text/turg nl, no rash/lesions   Neck S/S, TM, NT, no bruit Psych Nl mood and affect   Lung  CTA-B, unlabored, no DTP MSK Scar of right elbow   Ch wall NT, no deform       LABS and Imaging     Relevant and available CV data reviewed  Echo/MRI: none  Cath: none  Holter:none  EKG personally interpreted: normal sinus rhythm with LVH, no delta wave  Stress:none  moderate Risk  moderate Complexity/Medical Decision Making  Outside records Reviewed  Labs Reviewed  Prior Imaging, ekg,reviewed when available  Medications reviewed  Old Notes reviewed  ASSESSMENT AND PLAN     1. Elevated blood pressure  - 136/80  - on lopressor 25 mg bid  - 8/31/2021  Creat 0.9  - new problem  - differential: white coat hypertension, essential hypertension, steroid-induced hypertension, coarctation   plan  - will monitor blood pressure at home and call us with results in one week  - echo    2. Atypical Chest pain  - differential: costochondritis (likely), coronary anomaly, pericarditis, ischemia, arrhythmia    - new problem  - normal troponin and bnp  Plan  - plain gxt  - meloxicam 15 mg daily  - echo    3. Anabolic steroid use  - new problem  Plan:  - counseled on cessation       Patient counseled on lifestyle modification, diet, and exercise.     Follow Up:   3 months     [address] [facility]    Scribe Attestation:  Sung Rodriguez, am scribing for and in the presence of Jakob Mireles MD.   Michael Segal 09/28/21 7:59 AM   Physician Attestation  The scribe for and in the presence of alexis Ye DO). The scribe Lacb Jose M may have prepopulated components of this note with my historical  intellectual property under my direct supervision. Any additions to this intellectual property were performed in my presence and at my direction.   Furthermore, the content and accuracy of this note have been reviewed by me Wayne Ye DO).  9/28/2021 8:58 AM

## 2021-09-28 ENCOUNTER — OFFICE VISIT (OUTPATIENT)
Dept: CARDIOLOGY CLINIC | Age: 26
End: 2021-09-28
Payer: COMMERCIAL

## 2021-09-28 ENCOUNTER — TELEPHONE (OUTPATIENT)
Dept: CARDIOLOGY CLINIC | Age: 26
End: 2021-09-28

## 2021-09-28 VITALS
HEART RATE: 82 BPM | SYSTOLIC BLOOD PRESSURE: 136 MMHG | BODY MASS INDEX: 24.49 KG/M2 | WEIGHT: 197 LBS | DIASTOLIC BLOOD PRESSURE: 80 MMHG | HEIGHT: 75 IN | OXYGEN SATURATION: 99 %

## 2021-09-28 DIAGNOSIS — F55.3 ANABOLIC STEROID ABUSE: ICD-10-CM

## 2021-09-28 DIAGNOSIS — I10 HYPERTENSION, UNSPECIFIED TYPE: ICD-10-CM

## 2021-09-28 DIAGNOSIS — R07.9 CHEST PAIN, UNSPECIFIED TYPE: Primary | ICD-10-CM

## 2021-09-28 PROCEDURE — 1036F TOBACCO NON-USER: CPT | Performed by: INTERNAL MEDICINE

## 2021-09-28 PROCEDURE — G8427 DOCREV CUR MEDS BY ELIG CLIN: HCPCS | Performed by: INTERNAL MEDICINE

## 2021-09-28 PROCEDURE — 99204 OFFICE O/P NEW MOD 45 MIN: CPT | Performed by: INTERNAL MEDICINE

## 2021-09-28 PROCEDURE — G8420 CALC BMI NORM PARAMETERS: HCPCS | Performed by: INTERNAL MEDICINE

## 2021-09-28 RX ORDER — ASPIRIN 81 MG/1
81 TABLET ORAL DAILY
COMMUNITY

## 2021-09-28 RX ORDER — MELOXICAM 15 MG/1
15 TABLET ORAL DAILY
Qty: 90 TABLET | Refills: 1 | Status: SHIPPED | OUTPATIENT
Start: 2021-09-28 | End: 2022-04-28

## 2021-09-28 ASSESSMENT — ENCOUNTER SYMPTOMS
PHOTOPHOBIA: 0
CHEST TIGHTNESS: 1
ABDOMINAL PAIN: 0
NAUSEA: 0
ABDOMINAL DISTENTION: 0
SHORTNESS OF BREATH: 0
BLOOD IN STOOL: 0
COUGH: 0

## 2021-09-28 NOTE — TELEPHONE ENCOUNTER
Pt has a few more questions he forgot to ask DKW at his appointment today, 9/28. Can someone pls give him a call? Thank you.

## 2021-09-28 NOTE — PATIENT INSTRUCTIONS
How to take a blood pressure by the American [employer] Association   - don't smoke drink caffeinated beverages or exercise within 30 minutes before measuring your blood pressure. Empty your bladder and ensure at least 5 minutes of quiet rest before measurement. - sit with you back straight and supported ( on a dining chair, rather than a sofa) your feet should be flat on the floor and your legs should not be crossed. Our arm should be supported on a flat surface (such as a table) with the upper arm at heart level. Make sure the bottom of the cuff is placed directly above the bend of the elbow. - don't take the measurements over clothes.     Date             Blood Pressure          Comment                                                 Date             Blood Pressure          Comment      ----------          -------------------          ---------------------------------                         ----------          -------------------          ---------------------------------        ----------          -------------------          ---------------------------------                         ----------          -------------------          ---------------------------------        ----------          -------------------          ---------------------------------                         ----------          -------------------          ---------------------------------        ----------          -------------------          ---------------------------------                        ----------          -------------------          ---------------------------------        ----------          -------------------          ---------------------------------                        ----------          -------------------          ---------------------------------      Call or send via My Chart above readings on  --------------[id number]---------------------------            May use meloxicam 15 mg daily for chest  Wall pain  Plain

## 2021-09-28 NOTE — TELEPHONE ENCOUNTER
Made attempt to contact the patient who was unavailable at this time . Unable to [facility] as the mailbox is full .  Will try again later

## 2021-09-28 NOTE — LETTER
[address] [place] [address] Cardiology [place] [address] [address] [phone]  Phone: [phone]  Fax: [phone]    Forrest Goel DO    September 28, 2021     Kelli Crawford MD  [facility] [address]    Patient: Campos Chowdhury   MR Number: [id number]   YOB: 1995   Date of Visit: 9/28/2021       Dear Kelli Crawford: Thank you for referring Princess Bravo to me for evaluation/treatment. Below are the relevant portions of my assessment and plan of care. If you have questions, please do not hesitate to call me. I look forward to following [place] along with you.     Sincerely,      Forrest Goel, DO

## 2021-09-30 NOTE — TELEPHONE ENCOUNTER
Called placed to patient who was not available to speak with. Lmov to call the office at his convenience.

## 2021-10-05 NOTE — TELEPHONE ENCOUNTER
Call placed to patient who was not available for message. Will delete message due to being over a week. Will hold off to see if the patient will call back with his concerns.

## 2021-12-08 NOTE — TELEPHONE ENCOUNTER
Called patient  To check on his blood pressure  His blood pressure has been in 150-160's  He ran out of metoprolol and has not felt well since ( he felt the best he ever has since he was on metoprolol)  In am his blood pressure 117/75 and he feels great. As day goes on his blood pressure goes up and he feels a \"pressure\" with his heart beat as if his blood pressure is high. Occasional missed beat, palpitations at night.  Recommend to get scheduled for plain gxt and echo, but currently does not have insurance  Would like to know if metoprolol can be called in  [facility]

## 2021-12-08 NOTE — TELEPHONE ENCOUNTER
Please call and tell him that Dr Anshul Chávez refilled his metoprolol. Would like him to monitor his blood pressure at home and call us with the readings.  Recommend he make a follow up with alysa

## 2021-12-21 NOTE — TELEPHONE ENCOUNTER
Patient notified, states the Metoprolol helps \"so very much\" with blood pressure. Patient notified to schedule appt with DKW, tried to transfer and lost the call. Patient will call back for appt.

## 2022-01-26 ENCOUNTER — TELEPHONE (OUTPATIENT)
Dept: CARDIOLOGY CLINIC | Age: 27
End: 2022-01-26

## 2022-01-26 NOTE — TELEPHONE ENCOUNTER
Medication Refill    Medication needing refilled:  Metoprolol tartrate (loppressor)     Dosage of the medication:  25 mg    How are you taking this medication (QD, BID, TID, QID, PRN):  1 tablet by mouth daily    30 or 90 day supply called in:  90    When will you run out of your medication:  2 days    Which Pharmacy are we sending the medication to?:    [id number]  [address]  [phone]  Fax: [phone]

## 2022-01-26 NOTE — TELEPHONE ENCOUNTER
Received refill request for Metoprolol tartrate from [facility] pharmacy.     Last ov:2021 DKW    Last EK2021    Last Refill:2021 #180 tabs w/ 1 refill    Next appointment:none

## 2022-01-28 ENCOUNTER — TELEPHONE (OUTPATIENT)
Dept: CARDIOLOGY CLINIC | Age: 27
End: 2022-01-28

## 2022-01-28 NOTE — TELEPHONE ENCOUNTER
Please schedule for plain gxt and echo , and three month follow up   This was recommended last visit, but not scheduled  Thanks RAD

## 2022-02-01 NOTE — TELEPHONE ENCOUNTER
Medication Refill      Medication needing refilled:  Metroprotol tartrate (lopressor) 25 mg table    Dosage of the medication:  1 tablet by mouth 2 times per day    How are you taking this medication (QD, BID, TID, QID, PRN):  By mouth    30 or 90 day supply called in:  90    When will you run out of your medication:  Patient is out of the medication    Which Pharmacy are we sending the medication to?:  [facility] pharmacy [address] [facility]  [phone]

## 2022-02-01 NOTE — TELEPHONE ENCOUNTER
Called pharmacy verified Kassy Vaughn was received on 01/27/2022  RX was put on hold by insurance due to refill was to early to fill. Called patient to notify him on information received from [facility].  Patient stated he was out of his medication due to spilling them and losing half the bottle. Patient stated he would pay out of packet for medication because he is out and he needs his meds. [facility] is going to have meds ready for him to  and pay out of pocket for them. Call Complete.

## 2022-02-28 ENCOUNTER — HOSPITAL ENCOUNTER (OUTPATIENT)
Dept: NON INVASIVE DIAGNOSTICS | Age: 27
Discharge: HOME | End: 2022-02-28
Payer: COMMERCIAL

## 2022-02-28 DIAGNOSIS — R94.31 ABNORMAL EKG: ICD-10-CM

## 2022-02-28 DIAGNOSIS — I10 HYPERTENSION, UNSPECIFIED TYPE: ICD-10-CM

## 2022-02-28 DIAGNOSIS — R07.9 CHEST PAIN, UNSPECIFIED TYPE: ICD-10-CM

## 2022-02-28 DIAGNOSIS — R07.9 CHEST PAIN, UNSPECIFIED TYPE: Primary | ICD-10-CM

## 2022-02-28 LAB
LV EF: 58 %
LVEF MODALITY: NORMAL

## 2022-02-28 PROCEDURE — 93306 TTE W/DOPPLER COMPLETE: CPT

## 2022-02-28 PROCEDURE — 93017 CV STRESS TEST TRACING ONLY: CPT | Performed by: INTERNAL MEDICINE

## 2022-02-28 NOTE — PROGRESS NOTES
Patient instructed on Jose M Protocol Stress Test Procedure including possible side effects and adverse reactions. Verbalizes knowledge and understanding and denies having any questions.

## 2022-03-01 ENCOUNTER — TELEPHONE (OUTPATIENT)
Dept: CARDIOLOGY CLINIC | Age: 27
End: 2022-03-01

## 2022-03-01 NOTE — TELEPHONE ENCOUNTER
Call placed to patient who was not available for message below.  Lmor to call the office first thing in the am

## 2022-03-01 NOTE — TELEPHONE ENCOUNTER
----- Message from Agapito Bryant DO sent at 3/1/2022  1:05 PM EST -----  Please let Diane Job know that his echo is normal. He should return for his stress test.

## 2022-03-02 ENCOUNTER — HOSPITAL ENCOUNTER (OUTPATIENT)
Dept: NON INVASIVE DIAGNOSTICS | Age: 27
Discharge: HOME | End: 2022-03-02
Payer: COMMERCIAL

## 2022-03-02 DIAGNOSIS — R94.31 ABNORMAL EKG: ICD-10-CM

## 2022-03-02 DIAGNOSIS — R07.9 CHEST PAIN, UNSPECIFIED TYPE: ICD-10-CM

## 2022-03-02 LAB
LV EF: 50 %
LVEF MODALITY: NORMAL

## 2022-03-02 PROCEDURE — 93351 STRESS TTE COMPLETE: CPT

## 2022-03-04 ENCOUNTER — OFFICE VISIT (OUTPATIENT)
Dept: CARDIOLOGY CLINIC | Age: 27
End: 2022-03-04
Payer: COMMERCIAL

## 2022-03-04 VITALS
HEIGHT: 75 IN | HEART RATE: 94 BPM | RESPIRATION RATE: 19 BRPM | SYSTOLIC BLOOD PRESSURE: 132 MMHG | BODY MASS INDEX: 25.66 KG/M2 | WEIGHT: 206.4 LBS | DIASTOLIC BLOOD PRESSURE: 84 MMHG | OXYGEN SATURATION: 97 %

## 2022-03-04 DIAGNOSIS — R03.0 ELEVATED BLOOD PRESSURE READING: ICD-10-CM

## 2022-03-04 DIAGNOSIS — F55.3 ANABOLIC STEROID ABUSE: ICD-10-CM

## 2022-03-04 DIAGNOSIS — R07.9 CHEST PAIN, UNSPECIFIED TYPE: Primary | ICD-10-CM

## 2022-03-04 PROCEDURE — G8484 FLU IMMUNIZE NO ADMIN: HCPCS | Performed by: INTERNAL MEDICINE

## 2022-03-04 PROCEDURE — 99214 OFFICE O/P EST MOD 30 MIN: CPT | Performed by: INTERNAL MEDICINE

## 2022-03-04 PROCEDURE — G8419 CALC BMI OUT NRM PARAM NOF/U: HCPCS | Performed by: INTERNAL MEDICINE

## 2022-03-04 PROCEDURE — 1036F TOBACCO NON-USER: CPT | Performed by: INTERNAL MEDICINE

## 2022-03-04 PROCEDURE — G8427 DOCREV CUR MEDS BY ELIG CLIN: HCPCS | Performed by: INTERNAL MEDICINE

## 2022-03-04 RX ORDER — PROPRANOLOL HCL 60 MG
60 CAPSULE, EXTENDED RELEASE 24HR ORAL DAILY
Qty: 90 CAPSULE | Refills: 3 | Status: SHIPPED | OUTPATIENT
Start: 2022-03-04

## 2022-03-04 RX ORDER — AMLODIPINE BESYLATE 2.5 MG/1
2.5 TABLET ORAL DAILY
Qty: 90 TABLET | Refills: 3 | Status: SHIPPED | OUTPATIENT
Start: 2022-03-04

## 2022-03-04 ASSESSMENT — ENCOUNTER SYMPTOMS
COUGH: 0
SHORTNESS OF BREATH: 0
PHOTOPHOBIA: 0
NAUSEA: 0
BLOOD IN STOOL: 0
ABDOMINAL DISTENTION: 0
CHEST TIGHTNESS: 1
ABDOMINAL PAIN: 0

## 2022-03-04 NOTE — PATIENT INSTRUCTIONS
Stop lopressor  Start norvasc 2.5 mg daily at lunch  Start propanolol 60 mg in am  Only need to check blood pressure once a day  Follow up in 3 months  Echo and stress test were both good

## 2022-03-04 NOTE — PROGRESS NOTES
[address]  3/4/22  Referring: Dr.Reyes    REASON FOR CONSULT/CHIEF COMPLAINT/HPI     Reason for visit/ Chief complaint  New patient  Soraya Nascimento   HPI Edgar Heading is a 31 y.o. seen today as a follow up to recent stress/echo   to evaluate chest pain,  He has a history of hypertension, hearing loss right ear. He reports he has been suffering from anxiety over the last 8 months. ( Feared he harmed his heart when he was taking steroids/testosterone)  Admits he has a very stressful job. But does not think he is under any unusual stressors. Healthy as a child. No hospitalizations. Able to keep up with his peers. No history of syncope. He smoked tobacco during the college years, no longer does this, and does not vape. He had covid in December( treated as outpatient, only down one day). Never vaccinated. Occasional alcohol use. No cardiac history in his family. He went to the ER 8/31 with 4 day history of chest pain and headaches. Ekg showed LVH. During this time he was taking testosterone, steroids for workout enhancement. He has not taken any since. He was started on lopressor 25 mg daily and referred to us. He since had an echo and stress test to evaluate chest pain, both were unremarkable    Today he states he feels great when he gets up. 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 Does not feel like his life is stressful, but states he is [place] strung\" . No diarrhea/constipation/trouble urinating. He exercises 5-6 times a week, and feels great when he exercises ( runs, lift weights) Sometimes during mile 2-3 of an 8 mile run, he develops tunnel vision This has not increased in severity or frequency. As the day goes on (1-2 pm), he gets progressively short of breath associated with a higher blood pressure.    After 12 noon his blood pressure ranges in the 130's to 150's, and when he goes to bed his blood pressure is typically in the 160's. At night he feels  as if his heart is beating out of his chest, can feel every heart beat in his eyes. He did quit drinking caffeine(typically drinks 2-12 ounces of caffeine a day)without any change in symptoms. Patient is compliant with medications and is tolerating them well without side effects     HISTORY/ALLERGIES/ROS     MedHx:  has no past medical history on file. SurgHx:  has a past surgical history that includes Elbow fracture surgery (Right, 9/14/2020). SocHx:  reports that he has never smoked. He has never used smokeless tobacco. He reports that he does not drink alcohol and does not use drugs. FamHx: No family history of premature coronary artery disease, sudden death, or aneurysm  Allergies: Patient has no known allergies. ROS:   Review of Systems   Constitutional: Negative for activity change, diaphoresis, fatigue and fever. HENT: Negative for congestion and ear discharge. Eyes: Negative for photophobia and visual disturbance. Respiratory: Positive for chest tightness. Negative for cough and shortness of breath. Cardiovascular: Positive for palpitations. Negative for chest pain. Gastrointestinal: Negative for abdominal distention, abdominal pain, blood in stool and nausea. Endocrine: Negative for cold intolerance and polydipsia. Genitourinary: Negative for difficulty urinating and flank pain. Musculoskeletal: Positive for arthralgias and myalgias. Skin: Negative for rash and wound. Allergic/Immunologic: Negative for environmental allergies and immunocompromised state. Neurological: Negative for dizziness and headaches. Hematological: Negative for adenopathy. Does not bruise/bleed easily. Psychiatric/Behavioral: Negative for confusion. The patient is nervous/anxious. The patient is not hyperactive. MEDICATIONS      Prior to Admission medications    Medication Sig Start Date End Date Taking?  Authorizing Provider   metoprolol tartrate (LOPRESSOR) 25 MG tablet Take 1 tablet by mouth 2 times daily 1/27/22  Yes Sheila Luz,    aspirin 81 MG EC tablet Take 81 mg by mouth daily  Patient not taking: Reported on 3/4/2022    Historical Provider, MD   Coenzyme Q10 (CO Q 10 PO) Take by mouth  Patient not taking: Reported on 3/4/2022    Historical Provider, MD   meloxicam (MOBIC) 15 MG tablet Take 1 tablet by mouth daily  Patient not taking: Reported on 3/4/2022 9/28/21   Sheila Luz DO       PHYSICAL EXAM        Vitals:    03/04/22 1233   BP: 118/62   Pulse: 94   Resp: 19   SpO2: 97%    Weight: 206 lb 6.4 oz (93.6 kg)     Gen Alert, cooperative, no distress, fit Heart  Regular rate and rhythm. No murmur   Head Normocephalic, atraumatic, no abnormalities Abd  Soft, NT, +BS, no mass, no OM   Eyes PERRLA, conj/corn clear Ext  Ext nl, AT, no C/C, no edema   Nose Nares normal, no drain age, Non-tender Pulse 2+ and symmetric   Throat Lips, mucosa, tongue normal Skin Color/text/turg nl, no rash/lesions   Neck S/S, TM, NT, no bruit Psych Nl mood and affect   Lung  CTA-B, unlabored, no DTP MSK Scar of right elbow   Ch wall NT, no deform       LABS and Imaging     Relevant and available CV data reviewed  Echo/MRI: 2/28/22  Normal left ventricle size, wall thickness, and systolic function with an   estimated ejection fraction of 55-60%. -No regional wall motion abnormalities are seen.   -Normal diastolic function. Avg. E/e'=5.3   -Average global longitudinal strain is estimated at -16.3% (Normal)  Cath: none  Holter:none  EKG personally interpreted: normal sinus rhythm with LVH, no delta wave  Stress  3/2/22  Stress echo-- normal  10 minutes 45 seconds--THR  moderate Risk  moderate Complexity/Medical Decision Making  Outside records Reviewed  Labs Reviewed  Prior Imaging, ekg,reviewed when available  Medications reviewed  Old Notes reviewed  ASSESSMENT AND PLAN     1.  Elevated blood pressure reading  -  118/62  -  on lopressor 25 mg bid- tolerated well, but has higher blood pressures at   -  8/31/2021  Creat 0.9  -  differential: white coat hypertension, essential hypertension, steroid-induced hypertension, coarctation   - stable  plan  -  Stop lopressor  -  start propanolol in am to help with blood pressure and anxiety  -  start amlodipine 2.5 at noon  -  Cmp, protein/cr urine, pth, tsh, renin/aldosterone to evaluate for secondary causes. Consider sleep study, metanephrines, renal us if normal workup and persistent high bp     2. Palpitations and noncardiac chest discomfort  - stable  - stress without ischemia  Plan  - control stress  - bp management    3. Anxiety  - new problem  Plan  - recommend 150 minute of exercise weekly  - decrease caffeine use  - propanolol for htn/anxiety  - follow up with pcp    4. Anabolic steroid use  -  resolved, no further use  Plan:  -  counseled on complete avoidance. Patient counseled on lifestyle modification, diet, and exercise. Follow Up:    3 months      Dr. Yoko Chung:  I, Marcello Arango, am scribing for and in the presence of Syed Barrios MD.   Gema Beasley 03/04/22 12:55 PM   Physician Attestation  The scribe for and in the presence of alexis Nathan DO). The scribe Sinan Morning may have prepopulated components of this note with my historical  intellectual property under my direct supervision. Any additions to this intellectual property were performed in my presence and at my direction.   Furthermore, the content and accuracy of this note have been reviewed by alexis Nathan DO).  3/4/2022 12:55 PM

## 2022-04-28 RX ORDER — MELOXICAM 15 MG/1
TABLET ORAL
Qty: 90 TABLET | Refills: 1 | Status: SHIPPED | OUTPATIENT
Start: 2022-04-28

## 2022-06-12 ENCOUNTER — CLINICAL DOCUMENTATION (OUTPATIENT)
Dept: CARDIOLOGY | Age: 27
End: 2022-06-12

## 2022-11-01 RX ORDER — MELOXICAM 15 MG/1
TABLET ORAL
Qty: 90 TABLET | Refills: 1 | OUTPATIENT
Start: 2022-11-01

## 2022-11-01 NOTE — TELEPHONE ENCOUNTER
Received refill request for Meloxicam from [facility] pharmacy.     Last ov: 03/04/2022 DKW    Last Refill: 04/28/2022 #90 w/ 1 refill    Next appointment: none

## 2023-03-14 RX ORDER — PROPRANOLOL HCL 60 MG
60 CAPSULE, EXTENDED RELEASE 24HR ORAL DAILY
Qty: 90 CAPSULE | Refills: 1 | Status: SHIPPED | OUTPATIENT
Start: 2023-03-14

## 2023-03-14 RX ORDER — AMLODIPINE BESYLATE 2.5 MG/1
2.5 TABLET ORAL DAILY
Qty: 90 TABLET | Refills: 1 | Status: SHIPPED | OUTPATIENT
Start: 2023-03-14

## (undated) DEVICE — C-ARMOR C-ARM EQUIPMENT COVERS CLEAR STERILE UNIVERSAL FIT 12 PER CASE: Brand: C-ARMOR

## (undated) DEVICE — DRAPE C ARM UNIV W41XL74IN CLR PLAS XR VELC CLSR POLY STRP

## (undated) DEVICE — TRAY,IRRIGATION,BULBSYRINGE,60ML,CSR,PVP: Brand: MEDLINE

## (undated) DEVICE — SUTURE S STL SZ 7 L18IN NONABSORBABLE SIL TIE MFIL DS18

## (undated) DEVICE — CABLE BPLR L12FT FLYING LD DISPOSABLE

## (undated) DEVICE — BIT DRL DIA2.5MM FOR SM FRAG PLATING SYS

## (undated) DEVICE — PODIATRY PK

## (undated) DEVICE — PLATE ES AD W 9FT CRD 2

## (undated) DEVICE — SOLUTION IV 1000ML 0.9% SOD CHL

## (undated) DEVICE — BLANKET WRM W40.2XL55.9IN IORT LO BODY + MISTRAL AIR

## (undated) DEVICE — BANDAGE COMPR W1INXL5YD BGE E ADH TENSOPLAST

## (undated) DEVICE — PADDING CAST W4INXL4YD HIGHLY ABSRB THAN COT EZ APPL

## (undated) DEVICE — COVER LT HNDL BLU PLAS

## (undated) DEVICE — SUTURE VCRL UD BR CT 3-0 18IN CT1 J838D

## (undated) DEVICE — UNDERGLOVE SURG SZ 8 BLU LTX FREE SYN POLYISOPRENE POLYMER

## (undated) DEVICE — SUTURE ETHLN SZ 3-0 L18IN NONABSORBABLE BLK PS-2 L19MM 3/8 1669H

## (undated) DEVICE — JEWISH HOSPITAL TURNOVER KIT: Brand: MEDLINE INDUSTRIES, INC.

## (undated) DEVICE — SURGICAL SET UP - SURE SET: Brand: MEDLINE INDUSTRIES, INC.

## (undated) DEVICE — SPONGE,LAP,18"X18",DLX,XR,ST,5/PK,40/PK: Brand: MEDLINE

## (undated) DEVICE — 3M™ IOBAN™ 2 ANTIMICROBIAL INCISE DRAPE 6648EZ: Brand: IOBAN™ 2

## (undated) DEVICE — DRAPE 70X60IN SPLIT IMPERV ADHES STRIP

## (undated) DEVICE — GOWN,SIRUS,POLYRNF,BRTHSLV,XL,30/CS: Brand: MEDLINE

## (undated) DEVICE — DUAL HOSE W/CPC CONNECTORS: Brand: A.T.S.® TOURNIQUET SYSTEM

## (undated) DEVICE — GOWN,SIRUS,POLYRNF,BRTHSLV,XLN/XL,20/CS: Brand: MEDLINE

## (undated) DEVICE — 3M™ STERI-DRAPE™ U-DRAPE 1015: Brand: STERI-DRAPE™

## (undated) DEVICE — COVER,TABLE,HEAVY DUTY,77"X90",STRL: Brand: MEDLINE

## (undated) DEVICE — SUTURE VCRL SZ 3-0 L27IN ABSRB UD L19MM PS-2 3/8 CIR PRIM J427H

## (undated) DEVICE — LOOP,VESSEL,MAXI,BLUE,2/PK,STERILE: Brand: MEDLINE

## (undated) DEVICE — 3M™ STERI-DRAPE™ U-DRAPE 1067 1067 5/BX 4BX/CS/CTN&#X20;: Brand: STERI-DRAPE™

## (undated) DEVICE — E-Z CLEAN, NON-STICK, PTFE COATED, ELECTROSURGICAL BLADE ELECTRODE, 2.5 INCH (6.35 CM): Brand: EZ CLEAN

## (undated) DEVICE — APPLICATOR MEDICATED 26 CC SOLUTION HI LT ORNG CHLORAPREP

## (undated) DEVICE — ZIMMER® STERILE DISPOSABLE TOURNIQUET CUFF WITH PLC, DUAL PORT, SINGLE BLADDER, 24 IN. (61 CM)

## (undated) DEVICE — DRAPE SURGICAL HAND PROX AURORA

## (undated) DEVICE — SPLINT ORTH W4XL15IN LAYERED FBRGLS FOAM PD BRTH BK MOLD

## (undated) DEVICE — BIT DRL DIA2.7MM CALIB

## (undated) DEVICE — GLOVE ORTHO 7 1/2   MSG9475